# Patient Record
Sex: FEMALE | Race: WHITE | NOT HISPANIC OR LATINO | Employment: OTHER | URBAN - METROPOLITAN AREA
[De-identification: names, ages, dates, MRNs, and addresses within clinical notes are randomized per-mention and may not be internally consistent; named-entity substitution may affect disease eponyms.]

---

## 2017-02-14 ENCOUNTER — HOSPITAL ENCOUNTER (OUTPATIENT)
Dept: MAMMOGRAPHY | Facility: MEDICAL CENTER | Age: 52
Discharge: HOME | End: 2017-02-14
Payer: COMMERCIAL

## 2017-02-14 DIAGNOSIS — Z12.31 ENCOUNTER FOR SCREENING MAMMOGRAM FOR MALIGNANT NEOPLASM OF BREAST: ICD-10-CM

## 2017-02-14 PROCEDURE — G0202 SCR MAMMO BI INCL CAD: HCPCS

## 2017-02-14 PROCEDURE — 77063 BREAST TOMOSYNTHESIS BI: CPT

## 2017-02-20 ENCOUNTER — ALLSCRIPTS OFFICE VISIT (OUTPATIENT)
Dept: OTHER | Facility: OTHER | Age: 52
End: 2017-02-20

## 2017-05-18 ENCOUNTER — ALLSCRIPTS OFFICE VISIT (OUTPATIENT)
Dept: OTHER | Facility: OTHER | Age: 52
End: 2017-05-18

## 2017-05-18 DIAGNOSIS — M54.2 CERVICALGIA: ICD-10-CM

## 2017-05-18 DIAGNOSIS — S16.1XXA STRAIN OF MUSCLE, FASCIA AND TENDON AT NECK LEVEL, INITIAL ENCOUNTER: ICD-10-CM

## 2017-06-22 ENCOUNTER — TRANSCRIBE ORDERS (OUTPATIENT)
Dept: ADMINISTRATIVE | Facility: HOSPITAL | Age: 52
End: 2017-06-22

## 2017-06-22 ENCOUNTER — HOSPITAL ENCOUNTER (OUTPATIENT)
Dept: RADIOLOGY | Facility: HOSPITAL | Age: 52
Discharge: HOME | End: 2017-06-22
Payer: COMMERCIAL

## 2017-06-22 DIAGNOSIS — M54.2 CERVICALGIA: ICD-10-CM

## 2017-06-22 DIAGNOSIS — R52 PAIN: ICD-10-CM

## 2017-06-22 DIAGNOSIS — S16.1XXA STRAIN OF MUSCLE, FASCIA AND TENDON AT NECK LEVEL, INITIAL ENCOUNTER: ICD-10-CM

## 2017-06-22 DIAGNOSIS — R52 PAIN: Primary | ICD-10-CM

## 2017-06-22 PROCEDURE — 72050 X-RAY EXAM NECK SPINE 4/5VWS: CPT

## 2017-06-22 PROCEDURE — 72110 X-RAY EXAM L-2 SPINE 4/>VWS: CPT

## 2017-06-22 PROCEDURE — 72170 X-RAY EXAM OF PELVIS: CPT
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
## 2018-01-12 NOTE — MISCELLANEOUS
Message   Recorded as Task   Date: 12/05/2016 11:17 AM, Created By: Luis Fernando Restrepo   Task Name: Call Back   Assigned To: Gabbi Llamas   Regarding Patient: Heath Thornton, Status: Active   Comment:    Gabbi Llamas - 05 Dec 2016 11:17 AM     TASK CREATED  Pt called to inquire if hardware used in her surgery 2009 was MRI compatible    Confirmed with KDM and informed her it is          Signatures   Electronically signed by : Radha Lopez, ; Dec  5 2016 11:18AM EST                       (Author)

## 2018-01-13 VITALS
SYSTOLIC BLOOD PRESSURE: 120 MMHG | HEIGHT: 64 IN | WEIGHT: 151 LBS | RESPIRATION RATE: 16 BRPM | TEMPERATURE: 100.1 F | DIASTOLIC BLOOD PRESSURE: 78 MMHG | HEART RATE: 82 BPM | BODY MASS INDEX: 25.78 KG/M2

## 2018-01-13 VITALS
TEMPERATURE: 100.1 F | HEART RATE: 104 BPM | OXYGEN SATURATION: 97 % | SYSTOLIC BLOOD PRESSURE: 118 MMHG | WEIGHT: 147 LBS | BODY MASS INDEX: 25.1 KG/M2 | HEIGHT: 64 IN | DIASTOLIC BLOOD PRESSURE: 72 MMHG | RESPIRATION RATE: 15 BRPM

## 2018-02-14 DIAGNOSIS — Z12.31 ENCOUNTER FOR SCREENING MAMMOGRAM FOR MALIGNANT NEOPLASM OF BREAST: ICD-10-CM

## 2018-02-15 ENCOUNTER — HOSPITAL ENCOUNTER (OUTPATIENT)
Dept: RADIOLOGY | Facility: MEDICAL CENTER | Age: 53
Discharge: HOME | End: 2018-02-15
Payer: COMMERCIAL

## 2018-02-15 DIAGNOSIS — Z12.31 ENCOUNTER FOR SCREENING MAMMOGRAM FOR MALIGNANT NEOPLASM OF BREAST: ICD-10-CM

## 2018-02-15 PROCEDURE — 77063 BREAST TOMOSYNTHESIS BI: CPT

## 2018-02-15 PROCEDURE — 77067 SCR MAMMO BI INCL CAD: CPT

## 2018-02-22 ENCOUNTER — OFFICE VISIT (OUTPATIENT)
Dept: SURGICAL ONCOLOGY | Facility: CLINIC | Age: 53
End: 2018-02-22
Payer: COMMERCIAL

## 2018-02-22 VITALS
DIASTOLIC BLOOD PRESSURE: 68 MMHG | HEIGHT: 64 IN | WEIGHT: 153 LBS | SYSTOLIC BLOOD PRESSURE: 116 MMHG | HEART RATE: 88 BPM | BODY MASS INDEX: 26.12 KG/M2 | RESPIRATION RATE: 16 BRPM

## 2018-02-22 DIAGNOSIS — Z12.31 ENCOUNTER FOR SCREENING MAMMOGRAM FOR BREAST CANCER: ICD-10-CM

## 2018-02-22 DIAGNOSIS — Z80.3 FAMILY HISTORY OF BREAST CANCER IN MOTHER: ICD-10-CM

## 2018-02-22 DIAGNOSIS — R92.2 DENSE BREAST TISSUE: ICD-10-CM

## 2018-02-22 DIAGNOSIS — N60.19 FIBROCYSTIC BREAST CHANGES, UNSPECIFIED LATERALITY: Primary | ICD-10-CM

## 2018-02-22 PROCEDURE — 99213 OFFICE O/P EST LOW 20 MIN: CPT | Performed by: SURGERY

## 2018-02-22 RX ORDER — MELOXICAM 15 MG/1
TABLET ORAL
COMMUNITY
Start: 2018-02-14 | End: 2020-06-18 | Stop reason: HOSPADM

## 2018-02-22 NOTE — LETTER
February 22, 2018     [address]  [address] [place] [address]    Patient: Arjun Real   YOB: 1965   Date of Visit: 2/22/2018       Dear Dr Tania Sousa:    Thank you for referring Rupinder Shahid to me for evaluation  Below are my notes for this consultation  If you have questions, please do not hesitate to call me  I look forward to following your patient along with you  Sincerely,        Tammy Gómez MD        CC: No Recipients  Tammy Gómez MD  2/22/2018 10:14 AM  Sign at close encounter     Surgical Oncology Follow Up       [address] [place] [address]  1965  [id number]  [address] [facility],[address]  [facility] [place] [address]    Chief Complaint   Patient presents with    Follow-up     yearly follow up pt had mammo done 2/15       Assessment/Plan     Advance Care Planning/Advance Directives:  Did not discuss  with the patient  No history exists  History of Present Illness: annual follow up  -Interval History:n/a    Review of Systems:  Review of Systems   Constitutional: Negative  Negative for appetite change and fever  Eyes: Negative  Respiratory: Negative for shortness of breath  Cardiovascular: Negative  Gastrointestinal: Negative  Endocrine: Negative  Genitourinary: Negative  Musculoskeletal: Negative  Negative for arthralgias and myalgias  Skin: Negative  Allergic/Immunologic: Negative  Neurological: Negative  Hematological: Negative  Negative for adenopathy  Does not bruise/bleed easily  Psychiatric/Behavioral: Negative          Patient Active Problem List   Diagnosis    Fibrocystic breast changes    Dense breast tissue    Family history of breast cancer in mother     Past Medical History:   Diagnosis Date    Anxiety     Asthenia     Cervical vertebral fusion     Disease of thyroid gland     Fibrocystic breast changes     GERD (gastroesophageal reflux disease)     Heartburn     High risk HPV infection     Palpitations     Urinary frequency      Past Surgical History:   Procedure Laterality Date    BACK SURGERY  2008    cervical vertebral fusion    CERVIX SURGERY      cryosurgery    DILATION AND CURETTAGE OF UTERUS      HYSTERECTOMY  2007    SHOULDER SURGERY Left      Family History   Problem Relation Age of Onset    Breast cancer Mother      age at dx unk     Social History     Social History    Marital status: /Civil Union     Spouse name: N/A    Number of children: N/A    Years of education: N/A     Occupational History    Not on file  Social History Main Topics    Smoking status: Never Smoker    Smokeless tobacco: Never Used    Alcohol use No    Drug use: No    Sexual activity: Not on file     Other Topics Concern    Not on file     Social History Narrative    No narrative on file       Current Outpatient Prescriptions:     meloxicam (MOBIC) 15 mg tablet, , Disp: , Rfl:     omeprazole (PRILOSEC) 20 mg delayed release capsule, Take 20 mg by mouth , Disp: , Rfl:   Allergies   Allergen Reactions    Bactrim [Sulfamethoxazole-Trimethoprim] Itching    Duloxetine Hcl Vomiting    Hydrocodone Headache    Morphine Headache    Oxycodone Vomiting    Percocet  [Oxycodone-Acetaminophen]     Sulfa Antibiotics     Trimethoprim        The following portions of the patient's history were reviewed and updated as appropriate: allergies, current medications, past family history, past medical history, past social history, past surgical history and problem list         Vitals:    02/22/18 1000   BP: 116/68   Pulse: 88   Resp: 16       Physical Exam   Constitutional: She is oriented to person, place, and time  She appears well-developed and well-nourished  HENT:   Head: Normocephalic and atraumatic     Pulmonary/Chest: Right breast exhibits no inverted nipple, no mass, no nipple discharge, no skin change and no tenderness  Left breast exhibits no inverted nipple, no mass, no nipple discharge, no skin change and no tenderness  Lymphadenopathy:        Right axillary: No pectoral and no lateral adenopathy present  Left axillary: No pectoral and no lateral adenopathy present  Right: No supraclavicular adenopathy present  Left: No supraclavicular adenopathy present  Neurological: She is alert and oriented to person, place, and time  Psychiatric: She has a normal mood and affect  Results:    Imaging  02/15/2018 bilateral 3D screening mammogram is benign BI-RADS one the density category of three    I reviewed the above imaging data  Discussion/Summary:  49-year-old female with fibrocystic changes, dense breast tissue and family history  There are no concerns on examination today  Her recent mammogram is benign  I will therefore see her again in one year following her mammogram or sooner should the need arise

## 2018-02-22 NOTE — PROGRESS NOTES
Surgical Oncology Follow Up       [address]  [facility] [address]  1965  [id number]  [address]  [facility] [address] [id number]    Chief Complaint   Patient presents with    Follow-up     yearly follow up pt had mammo done 2/15       Assessment/Plan     Advance Care Planning/Advance Directives:  Did not discuss  with the patient  No history exists  History of Present Illness: annual follow up  -Interval History:n/a    Review of Systems:  Review of Systems   Constitutional: Negative  Negative for appetite change and fever  Eyes: Negative  Respiratory: Negative for shortness of breath  Cardiovascular: Negative  Gastrointestinal: Negative  Endocrine: Negative  Genitourinary: Negative  Musculoskeletal: Negative  Negative for arthralgias and myalgias  Skin: Negative  Allergic/Immunologic: Negative  Neurological: Negative  Hematological: Negative  Negative for adenopathy  Does not bruise/bleed easily  Psychiatric/Behavioral: Negative          Patient Active Problem List   Diagnosis    Fibrocystic breast changes    Dense breast tissue    Family history of breast cancer in mother     Past Medical History:   Diagnosis Date    Anxiety     Asthenia     Cervical vertebral fusion     Disease of thyroid gland     Fibrocystic breast changes     GERD (gastroesophageal reflux disease)     Heartburn     High risk HPV infection     Palpitations     Urinary frequency      Past Surgical History:   Procedure Laterality Date    BACK SURGERY  2008    cervical vertebral fusion    CERVIX SURGERY      cryosurgery    DILATION AND CURETTAGE OF UTERUS      HYSTERECTOMY  2007    SHOULDER SURGERY Left      Family History   Problem Relation Age of Onset    Breast cancer Mother      age at dx unk     Social History     Social History    Marital status: /Civil Union     Spouse name: N/A    Number of children: N/A    Years of education: N/A     Occupational History    Not on file  Social History Main Topics    Smoking status: Never Smoker    Smokeless tobacco: Never Used    Alcohol use No    Drug use: No    Sexual activity: Not on file     Other Topics Concern    Not on file     Social History Narrative    No narrative on file       Current Outpatient Prescriptions:     meloxicam (MOBIC) 15 mg tablet, , Disp: , Rfl:     omeprazole (PRILOSEC) 20 mg delayed release capsule, Take 20 mg by mouth , Disp: , Rfl:   Allergies   Allergen Reactions    Bactrim [Sulfamethoxazole-Trimethoprim] Itching    Duloxetine Hcl Vomiting    Hydrocodone Headache    Morphine Headache    Oxycodone Vomiting    Percocet  [Oxycodone-Acetaminophen]     Sulfa Antibiotics     Trimethoprim        The following portions of the patient's history were reviewed and updated as appropriate: allergies, current medications, past family history, past medical history, past social history, past surgical history and problem list         Vitals:    02/22/18 1000   BP: 116/68   Pulse: 88   Resp: 16       Physical Exam   Constitutional: She is oriented to person, place, and time  She appears well-developed and well-nourished  HENT:   Head: Normocephalic and atraumatic  Pulmonary/Chest: Right breast exhibits no inverted nipple, no mass, no nipple discharge, no skin change and no tenderness  Left breast exhibits no inverted nipple, no mass, no nipple discharge, no skin change and no tenderness  Lymphadenopathy:        Right axillary: No pectoral and no lateral adenopathy present  Left axillary: No pectoral and no lateral adenopathy present  Right: No supraclavicular adenopathy present  Left: No supraclavicular adenopathy present  Neurological: She is alert and oriented to person, place, and time     Psychiatric: She has a normal mood and affect  Results:    Imaging  02/15/2018 bilateral 3D screening mammogram is benign BI-RADS one the density category of three    I reviewed the above imaging data  Discussion/Summary:  57-year-old female with fibrocystic changes, dense breast tissue and family history  There are no concerns on examination today  Her recent mammogram is benign  I will therefore see her again in one year following her mammogram or sooner should the need arise

## 2018-07-05 ENCOUNTER — TRANSCRIBE ORDERS (OUTPATIENT)
Dept: ADMINISTRATIVE | Facility: HOSPITAL | Age: 53
End: 2018-07-05

## 2018-07-05 ENCOUNTER — APPOINTMENT (OUTPATIENT)
Dept: RADIOLOGY | Facility: MEDICAL CENTER | Age: 53
End: 2018-07-05
Payer: COMMERCIAL

## 2018-07-05 DIAGNOSIS — R07.81 PLEURITIC CHEST PAIN: Primary | ICD-10-CM

## 2018-07-05 PROCEDURE — 71046 X-RAY EXAM CHEST 2 VIEWS: CPT

## 2018-09-13 ENCOUNTER — TELEPHONE (OUTPATIENT)
Dept: SURGICAL ONCOLOGY | Facility: CLINIC | Age: 53
End: 2018-09-13

## 2018-09-13 NOTE — TELEPHONE ENCOUNTER
Pt called with concerns of a possible recurring cyst in her left breast  She is feeling discomfort and a lump in her left breast   She shares she feels this is similar to her previous cyst she had aspirated  Offerred appt earlier but pt wanted to be seen at the [facility]  Appt scheduled for 09/27/18

## 2018-09-27 ENCOUNTER — OFFICE VISIT (OUTPATIENT)
Dept: SURGICAL ONCOLOGY | Facility: CLINIC | Age: 53
End: 2018-09-27
Payer: COMMERCIAL

## 2018-09-27 VITALS
BODY MASS INDEX: 25.78 KG/M2 | HEIGHT: 64 IN | SYSTOLIC BLOOD PRESSURE: 120 MMHG | WEIGHT: 151 LBS | RESPIRATION RATE: 16 BRPM | HEART RATE: 79 BPM | TEMPERATURE: 98.9 F | DIASTOLIC BLOOD PRESSURE: 86 MMHG

## 2018-09-27 DIAGNOSIS — N60.12 FIBROCYSTIC CHANGES OF LEFT BREAST: Primary | ICD-10-CM

## 2018-09-27 PROCEDURE — 76642 ULTRASOUND BREAST LIMITED: CPT | Performed by: SURGERY

## 2018-09-27 PROCEDURE — 99213 OFFICE O/P EST LOW 20 MIN: CPT | Performed by: SURGERY

## 2018-09-27 RX ORDER — BUDESONIDE 90 UG/1
AEROSOL, POWDER RESPIRATORY (INHALATION)
Refills: 0 | COMMUNITY
Start: 2018-09-21

## 2018-09-27 RX ORDER — MONTELUKAST SODIUM 10 MG/1
TABLET ORAL
COMMUNITY
Start: 2018-08-30

## 2018-09-27 RX ORDER — ALBUTEROL SULFATE 90 UG/1
2 AEROSOL, METERED RESPIRATORY (INHALATION)
COMMUNITY
Start: 2018-09-18

## 2018-09-27 NOTE — PROGRESS NOTES
Surgical Oncology Follow Up       [address] [place] [address],[address] [place] [address]  [facility] [address]  1965  [id number]  [address] [place] [address],[address] [place] [address]  [facility] SURG[facility] ONCOLOG[facility] [place] [id number]    Chief Complaint   Patient presents with    Breast Mass     Pt is here for left breast lump        Assessment/Plan   Diagnoses and all orders for this visit:    Fibrocystic changes of left breast            Advance Care Planning/Advance Directives:  Did not discuss  with the patient  Oncology History:     No history exists  History of Present Illness: follow up   -Interval History: left sided breast pain x 3 months    Review of Systems:  Review of Systems   Constitutional: Negative  Negative for appetite change and fever  Eyes: Negative  Respiratory: Negative for shortness of breath  Cardiovascular: Negative  Gastrointestinal: Negative  Endocrine: Negative  Genitourinary: Negative  Musculoskeletal: Negative  Negative for arthralgias and myalgias  Skin: Negative  Allergic/Immunologic: Negative  Neurological: Negative  Hematological: Negative  Negative for adenopathy  Does not bruise/bleed easily  Psychiatric/Behavioral: Negative          Patient Active Problem List   Diagnosis    Fibrocystic breast changes    Dense breast tissue    Family history of breast cancer in mother    Encounter for screening mammogram for breast cancer     Past Medical History:   Diagnosis Date    Anxiety     Asthenia     Asthma     Cervical vertebral fusion     Disease of thyroid gland     Fibrocystic breast changes     GERD (gastroesophageal reflux disease)     Heartburn     High risk HPV infection     Palpitations     Palpitations     Thyroid disease     Thyroid nodule     Urinary frequency      Past Surgical History:   Procedure Laterality Date    BACK SURGERY  2008 cervical vertebral fusion    CERVICAL FUSION  2008    CERVIX SURGERY      cryosurgery    DILATION AND CURETTAGE OF UTERUS      SAB    HYSTERECTOMY  2007    SHOULDER SURGERY Left      Family History   Problem Relation Age of Onset   Tamar Martin Breast cancer Mother         age at dx unk     Social History     Social History    Marital status: /Civil Union     Spouse name: N/A    Number of children: 1    Years of education: N/A     Occupational History          Social History Main Topics    Smoking status: Never Smoker    Smokeless tobacco: Never Used    Alcohol use No    Drug use: No    Sexual activity: Not on file     Other Topics Concern    Not on file     Social History Narrative    Denied: H/O domestic violence    Lives with            Current Outpatient Prescriptions:     albuterol (PROVENTIL HFA,VENTOLIN HFA) 90 mcg/act inhaler, Inhale 2 puffs, Disp: , Rfl:     meloxicam (MOBIC) 15 mg tablet, , Disp: , Rfl:     montelukast (SINGULAIR) 10 mg tablet, TAKE 1 TABLET (10 MG TOTAL) BY MOUTH NIGHTLY , Disp: , Rfl:     omeprazole (PRILOSEC) 20 mg delayed release capsule, Take 20 mg by mouth , Disp: , Rfl:     PULMICORT FLEXHALER 90 MCG/ACT inhaler, INHALE TWO PUFFS TWICE A DAY , Disp: , Rfl: 0  Allergies   Allergen Reactions    Bactrim [Sulfamethoxazole-Trimethoprim] Itching    Duloxetine Hcl Vomiting    Hydrocodone Headache    Morphine Headache    Oxycodone Vomiting    Percocet  [Oxycodone-Acetaminophen]     Sulfa Antibiotics     Trimethoprim        The following portions of the patient's history were reviewed and updated as appropriate: allergies, current medications, past family history, past medical history, past social history, past surgical history and problem list         Vitals:    09/27/18 1118   BP: 120/86   Pulse: 79   Resp: 16   Temp: 98 9 °F (37 2 °C)       Physical Exam   Constitutional: She is oriented to person, place, and time   She appears well-developed and well-nourished  HENT:   Head: Normocephalic and atraumatic  Pulmonary/Chest: Left breast exhibits tenderness  Left breast exhibits no inverted nipple, no mass, no nipple discharge and no skin change  Lymphadenopathy:        Left axillary: No pectoral and no lateral adenopathy present  Left: No supraclavicular adenopathy present  Neurological: She is alert and oriented to person, place, and time  Psychiatric: She has a normal mood and affect  Results:      Imaging       Breast Ultrasound     Date/Time 9/27/2018 11:45 AM     Performed by  Amira Hay by Irma De Anda      Procedure Details   Procedure Notes: Ultrasound was performed with attention to the upper outer left breast in the area of fullness and tenderness  There is dense glandular tissue throughout  There are two areas of complex appearing cysts in the one o'clock periareolar deep axis adjacent to the rib and the outer portion of the left breast   Both of these are subcentimeter in size  There are no solid masses or areas of distortion  There are no cysts amenable for aspiration  Discussion/Summary:  57-year-old female with a history of dense breast tissue, fibrocystic changes and family history of breast cancer  In the area of concern today in the upper outer left breast she has dense glandular tissue and subcentimeter fibrocystic changes  There is nothing amenable to the cyst aspiration  She is scheduled for mammogram in February  I am seeing her again following this  I advised her to return sooner should the need arise

## 2018-10-06 ENCOUNTER — ANNUAL EXAM (OUTPATIENT)
Dept: OBGYN CLINIC | Facility: CLINIC | Age: 53
End: 2018-10-06
Payer: COMMERCIAL

## 2018-10-06 VITALS
BODY MASS INDEX: 24.75 KG/M2 | DIASTOLIC BLOOD PRESSURE: 80 MMHG | SYSTOLIC BLOOD PRESSURE: 126 MMHG | HEIGHT: 64 IN | WEIGHT: 145 LBS

## 2018-10-06 DIAGNOSIS — Z12.4 PAP SMEAR FOR CERVICAL CANCER SCREENING: ICD-10-CM

## 2018-10-06 DIAGNOSIS — Z01.419 ENCOUNTER FOR GYNECOLOGICAL EXAMINATION WITHOUT ABNORMAL FINDING: Primary | ICD-10-CM

## 2018-10-06 DIAGNOSIS — N30.01 ACUTE CYSTITIS WITH HEMATURIA: ICD-10-CM

## 2018-10-06 DIAGNOSIS — R10.2 PELVIC PAIN: ICD-10-CM

## 2018-10-06 LAB
SL AMB  POCT GLUCOSE, UA: NEGATIVE
SL AMB LEUKOCYTE ESTERASE,UA: ABNORMAL
SL AMB POCT BLOOD,UA: ABNORMAL
SL AMB POCT KETONES,UA: NEGATIVE
SL AMB POCT NITRITE,UA: NEGATIVE
SL AMB POCT PH,UA: 7.5
SL AMB POCT SPECIFIC GRAVITY,UA: 1.01
SL AMB POCT URINE PROTEIN: NEGATIVE

## 2018-10-06 PROCEDURE — S0612 ANNUAL GYNECOLOGICAL EXAMINA: HCPCS | Performed by: OBSTETRICS & GYNECOLOGY

## 2018-10-06 PROCEDURE — 81002 URINALYSIS NONAUTO W/O SCOPE: CPT | Performed by: OBSTETRICS & GYNECOLOGY

## 2018-10-06 RX ORDER — NITROFURANTOIN 25; 75 MG/1; MG/1
100 CAPSULE ORAL 2 TIMES DAILY
Qty: 14 CAPSULE | Refills: 0 | Status: SHIPPED | OUTPATIENT
Start: 2018-10-06 | End: 2020-06-18 | Stop reason: ALTCHOICE

## 2018-10-06 RX ORDER — DIAZEPAM 5 MG/1
5 TABLET ORAL EVERY 6 HOURS PRN
COMMUNITY

## 2018-10-06 NOTE — PROGRESS NOTES
Assessment/Plan:    Normal annual gynecological exam  Pap smear done of the vaginal cuff  New onset mid right abdominal and mid for right pelvic pain which is intermittent but daily over the last 3 months, we could not reproduce the pain on today's exam   We will send her for abdominal and pelvic CT scan to evaluate the area  She had a recent urinary tract infection which she said she did receive a test of cure, will recheck a urine at today's visit to be sure that there are no associated urinary tract infections or problems  Problem List Items Addressed This Visit     Encounter for gynecological examination without abnormal finding - Primary    Relevant Orders    GP Liquid-Based Pap + HPV Plus    Pap smear for cervical cancer screening    Relevant Orders    GP Liquid-Based Pap + HPV Plus    Pelvic pain    Relevant Orders    CT abdomen pelvis w contrast            Subjective:      Patient ID: Arjun Real is a 52 y o  female  Here for annual exam with complaints of RLQ / midquadrant pain over the last 3 months - intermittent but at least daily - no vaginal bleeding or discharge - bowels are normal and bladder is normal but has frequent urination and some leakage - had recent UTI with e coli and was treated - up to date with colonoscopy and mammogram  - recent blood work and f/u with rheumatology due to Sjogrens diagnosis - she had had a pelvic ultrasound 2 years ago and the left ovary was visualized as 2 cm and normal right ovary could not be seen  We spoke about trying to do it imaging of this area that is uncomfortable of we both side of the perhaps a CT scan would give us more information  The area of pain that she denotes begins kind of in the mid abdominal area and courses down towards the bladder        The following portions of the patient's history were reviewed and updated as appropriate:   She  has a past medical history of Anxiety; Arthritis; Asthenia;  Asthma; Breast disorder; Cervical vertebral fusion; Disease of thyroid gland; Fibrocystic breast changes; GERD (gastroesophageal reflux disease); Heartburn; High risk HPV infection; IBS (irritable bowel syndrome); Palpitations; Palpitations; Thyroid disease; Thyroid nodule; and Urinary frequency  She   Patient Active Problem List    Diagnosis Date Noted    Encounter for gynecological examination without abnormal finding 10/06/2018    Pap smear for cervical cancer screening 10/06/2018    Pelvic pain 10/06/2018    Family history of breast cancer in mother 02/22/2018    Encounter for screening mammogram for breast cancer 02/22/2018    Fibrocystic breast changes     Dense breast tissue      She  has a past surgical history that includes Back surgery (2008); Cervix surgery; Dilation and curettage of uterus; Hysterectomy (2007); Shoulder surgery (Left); and Cervical fusion (2008)  Her family history includes Breast cancer in her mother; Heart disease in her father  She  reports that she has never smoked  She has never used smokeless tobacco  She reports that she does not drink alcohol or use drugs  Current Outpatient Prescriptions   Medication Sig Dispense Refill    albuterol (PROVENTIL HFA,VENTOLIN HFA) 90 mcg/act inhaler Inhale 2 puffs      diazepam (VALIUM) 5 mg tablet Take 5 mg by mouth every 6 (six) hours as needed for anxiety      meloxicam (MOBIC) 15 mg tablet       PULMICORT FLEXHALER 90 MCG/ACT inhaler INHALE TWO PUFFS TWICE A DAY  0    montelukast (SINGULAIR) 10 mg tablet TAKE 1 TABLET (10 MG TOTAL) BY MOUTH NIGHTLY   omeprazole (PRILOSEC) 20 mg delayed release capsule Take 20 mg by mouth  No current facility-administered medications for this visit  Current Outpatient Prescriptions on File Prior to Visit   Medication Sig    albuterol (PROVENTIL HFA,VENTOLIN HFA) 90 mcg/act inhaler Inhale 2 puffs    meloxicam (MOBIC) 15 mg tablet     PULMICORT FLEXHALER 90 MCG/ACT inhaler INHALE TWO PUFFS TWICE A DAY      montelukast (SINGULAIR) 10 mg tablet TAKE 1 TABLET (10 MG TOTAL) BY MOUTH NIGHTLY   omeprazole (PRILOSEC) 20 mg delayed release capsule Take 20 mg by mouth  No current facility-administered medications on file prior to visit  She is allergic to bactrim [sulfamethoxazole-trimethoprim]; duloxetine hcl; hydrocodone; morphine; oxycodone; percocet  [oxycodone-acetaminophen]; sulfa antibiotics; and trimethoprim       Review of Systems   Constitutional: Positive for fatigue  Negative for chills, fever and unexpected weight change  HENT: Negative for dental problem, sinus pain and sinus pressure  Eyes: Negative for visual disturbance  Respiratory: Negative for cough, shortness of breath and wheezing  Cardiovascular: Negative for chest pain and leg swelling  Gastrointestinal: Negative for constipation, diarrhea, nausea and vomiting  Endocrine: Positive for heat intolerance and polydipsia  Genitourinary: Positive for frequency and pelvic pain  Negative for menstrual problem and urgency  Musculoskeletal: Positive for arthralgias and back pain  Skin:        Breast pain and lumps   Allergic/Immunologic: Positive for environmental allergies  Neurological: Positive for dizziness  Negative for headaches  Hematological: Bruises/bleeds easily  Psychiatric/Behavioral: The patient is nervous/anxious  Objective:             Physical Exam   Constitutional: She is oriented to person, place, and time  She appears well-developed and well-nourished  No distress  Older white female    HENT:   Head: Normocephalic and atraumatic  Neck: Normal range of motion  Neck supple  No thyromegaly present  Cardiovascular: Normal rate and regular rhythm  Pulmonary/Chest: Effort normal and breath sounds normal  No respiratory distress  She has no wheezes  She has no rales  She exhibits no tenderness   Right breast exhibits no inverted nipple, no mass, no nipple discharge, no skin change and no tenderness  Left breast exhibits no inverted nipple, no mass, no nipple discharge, no skin change and no tenderness  Breasts are symmetrical    Abdominal: Soft  She exhibits no distension and no mass  There is no tenderness  There is no rebound and no guarding  Genitourinary:   Genitourinary Comments: Normal female no vulvar or vaginal lesions, vaginal cuff is intact and well healed and Pap smear is taken, the vaginal walls are atrophic, cervix and uterus are surgically absent and there are no palpable masses there are no adnexal masses either and the exam is nontender  Deep palpation of the internal areas elicited no discomfort and we could not duplicate the pain that she is having on the right side  Rectal exam reveals no palpable masses and has normal sphincter tone with guaiac-negative stool again deep palpation did not find any discomfort or reproduce any of the right abdominal pain she is experiencing   Neurological: She is alert and oriented to person, place, and time  Psychiatric: She has a normal mood and affect  Her behavior is normal    Vitals reviewed  Addendum a urinalysis done in the office did show moderate amount of blood and leukocytes and decisions made to place Michelle Mallory back on an antibiotic  We do have some concerns that some of this pain could indeed be kidney stone related and so she is encouraged to strain her urine and go ahead and call for the CT scan we ordered  For some reason the pain becomes severe she should present to the emergency room for further evaluation    We will let her know what the culture shows and whether not we need to follow-up otherwise the

## 2018-10-08 ENCOUNTER — TELEPHONE (OUTPATIENT)
Dept: UROLOGY | Facility: AMBULATORY SURGERY CENTER | Age: 53
End: 2018-10-08

## 2018-10-08 LAB — BACTERIA UR CULT: NORMAL

## 2018-10-08 NOTE — TELEPHONE ENCOUNTER
Reason for appointment/Complaint/Diagnosis : kidney stone symptoms/hx of hematuria    Insurance: BC    History of Cancer? no                       If yes, what kind? Previous urologist?     Yes more than 10years                  Records requested/where? NO    Outside testing/where? NO    Location Preference for office visit?  [facility]

## 2018-10-10 LAB
HPV HR 12 DNA CVX QL NAA+PROBE: NOT DETECTED
HPV16 DNA SPEC QL NAA+PROBE: NOT DETECTED
HPV18 DNA SPEC QL NAA+PROBE: NOT DETECTED
THIN PREP CVX: NORMAL

## 2018-10-12 ENCOUNTER — HOSPITAL ENCOUNTER (OUTPATIENT)
Dept: RADIOLOGY | Facility: MEDICAL CENTER | Age: 53
Discharge: HOME | End: 2018-10-12
Payer: COMMERCIAL

## 2018-10-12 DIAGNOSIS — R10.2 PELVIC PAIN: ICD-10-CM

## 2018-10-12 PROCEDURE — 74177 CT ABD & PELVIS W/CONTRAST: CPT

## 2018-10-12 RX ADMIN — IOHEXOL 100 ML: 350 INJECTION, SOLUTION INTRAVENOUS at 13:06

## 2018-10-16 NOTE — PROGRESS NOTES
Please call patient her and review result - had right midlevel pain - should review liver findings with PCP or a specialist

## 2019-02-18 ENCOUNTER — HOSPITAL ENCOUNTER (OUTPATIENT)
Dept: RADIOLOGY | Facility: MEDICAL CENTER | Age: 54
Discharge: HOME | End: 2019-02-18
Payer: COMMERCIAL

## 2019-02-18 VITALS — HEIGHT: 64 IN | WEIGHT: 145 LBS | BODY MASS INDEX: 24.75 KG/M2

## 2019-02-18 DIAGNOSIS — Z12.31 ENCOUNTER FOR SCREENING MAMMOGRAM FOR BREAST CANCER: ICD-10-CM

## 2019-02-18 PROCEDURE — 77063 BREAST TOMOSYNTHESIS BI: CPT

## 2019-02-18 PROCEDURE — 77067 SCR MAMMO BI INCL CAD: CPT

## 2019-02-28 ENCOUNTER — OFFICE VISIT (OUTPATIENT)
Dept: SURGICAL ONCOLOGY | Facility: CLINIC | Age: 54
End: 2019-02-28
Payer: COMMERCIAL

## 2019-02-28 VITALS
HEIGHT: 64 IN | SYSTOLIC BLOOD PRESSURE: 120 MMHG | TEMPERATURE: 98.5 F | BODY MASS INDEX: 25.78 KG/M2 | RESPIRATION RATE: 14 BRPM | WEIGHT: 151 LBS | HEART RATE: 75 BPM | DIASTOLIC BLOOD PRESSURE: 80 MMHG

## 2019-02-28 DIAGNOSIS — N64.4 CHRONIC PAIN OF BREAST: ICD-10-CM

## 2019-02-28 DIAGNOSIS — Z80.3 FAMILY HISTORY OF BREAST CANCER IN MOTHER: ICD-10-CM

## 2019-02-28 DIAGNOSIS — R92.2 DENSE BREAST TISSUE: ICD-10-CM

## 2019-02-28 DIAGNOSIS — G89.29 CHRONIC PAIN OF BREAST: ICD-10-CM

## 2019-02-28 DIAGNOSIS — N60.19 FIBROCYSTIC BREAST CHANGES, UNSPECIFIED LATERALITY: Primary | ICD-10-CM

## 2019-02-28 PROCEDURE — 99213 OFFICE O/P EST LOW 20 MIN: CPT | Performed by: SURGERY

## 2019-02-28 RX ORDER — ALPRAZOLAM 0.5 MG/1
TABLET ORAL
COMMUNITY
Start: 2018-12-07

## 2019-02-28 NOTE — PROGRESS NOTES
Surgical Oncology Follow Up       [address] [place] [address],[address] [place] [address]  [facility] [address]  1965  [id number]  [address] [facility],[address] [place] [address]  [facility] SURGICAL ONCOLOGY [address] [address]    Chief Complaint   Patient presents with    Follow-up     1 year        Assessment/Plan   Diagnoses and all orders for this visit:    Fibrocystic breast changes, unspecified laterality    Chronic pain of breast  -     Evening Primrose Oil 1000 MG CAPS; Take 0 09 capsules (90 mg total) by mouth 3 (three) times a day with meals    Dense breast tissue    Family history of breast cancer in mother    Other orders  -     ALPRAZolam (XANAX) 0 5 mg tablet; TAKE 1 TABLET BY MOUTH AT BEDTIME AS NEEDED        Advance Care Planning/Advance Directives:  Did not discuss  with the patient  Oncology History:     No history exists  History of Present Illness: follow up   -Interval History: none    Review of Systems:  Review of Systems   Constitutional: Negative  Negative for appetite change and fever  Eyes: Negative  Respiratory: Negative for shortness of breath  Cardiovascular: Negative  Gastrointestinal: Negative  Endocrine: Negative  Genitourinary: Negative  Musculoskeletal: Negative  Negative for arthralgias and myalgias  Skin: Negative  Allergic/Immunologic: Negative  Neurological: Negative  Hematological: Negative  Negative for adenopathy  Does not bruise/bleed easily  Psychiatric/Behavioral: Negative          Patient Active Problem List   Diagnosis    Fibrocystic breast changes    Dense breast tissue    Family history of breast cancer in mother   [facility] Encounter for screening mammogram for breast cancer    Encounter for gynecological examination without abnormal finding    Pap smear for cervical cancer screening    Pelvic pain    Acute cystitis with hematuria    Chronic pain of breast     Past Medical History:   Diagnosis Date    Anxiety     Arthritis     Asthenia     Asthma     Cervical vertebral fusion     Disease of thyroid gland     Fibrocystic breast changes     GERD (gastroesophageal reflux disease)     Heartburn     High risk HPV infection     IBS (irritable bowel syndrome)     Palpitations     Palpitations     Thyroid disease     Thyroid nodule     Urinary frequency      Past Surgical History:   Procedure Laterality Date    BACK SURGERY  2008    cervical vertebral fusion    BREAST CYST ASPIRATION Left 2007    CERVICAL FUSION  2008    CERVIX SURGERY      cryosurgery    DILATION AND CURETTAGE OF UTERUS      SAB    HYSTERECTOMY  2007    SHOULDER SURGERY Left      Family History   Problem Relation Age of Onset   Hair Abt Breast cancer Mother         age at dx unk    Heart disease Father      Social History     Socioeconomic History    Marital status: /Civil Union     Spouse name: Not on file    Number of children: 1    Years of education: Not on file    Highest education level: Not on file   Occupational History    Occupation:    Social Needs    Financial resource strain: Not on file    Food insecurity:     Worry: Not on file     Inability: Not on file    Transportation needs:     Medical: Not on file     Non-medical: Not on file   Tobacco Use    Smoking status: Never Smoker    Smokeless tobacco: Never Used   Substance and Sexual Activity    Alcohol use: No    Drug use: No    Sexual activity: Yes     Partners: Male     Comment: pt declines hiv std testing   Lifestyle    Physical activity:     Days per week: Not on file     Minutes per session: Not on file    Stress: Not on file   Relationships    Social connections:     Talks on phone: Not on file     Gets together: Not on file     Attends Christianity service: Not on file     Active member of club or organization: Not on file     Attends meetings of clubs or organizations: Not on file     Relationship status: Not on file    Intimate partner violence:     Fear of current or ex partner: Not on file     Emotionally abused: Not on file     Physically abused: Not on file     Forced sexual activity: Not on file   Other Topics Concern    Not on file   Social History Narrative    Denied: H/O domestic violence    Lives with        Current Outpatient Medications:     albuterol (PROVENTIL HFA,VENTOLIN HFA) 90 mcg/act inhaler, Inhale 2 puffs, Disp: , Rfl:     ALPRAZolam (XANAX) 0 5 mg tablet, TAKE 1 TABLET BY MOUTH AT BEDTIME AS NEEDED, Disp: , Rfl:     diazepam (VALIUM) 5 mg tablet, Take 5 mg by mouth every 6 (six) hours as needed for anxiety, Disp: , Rfl:     meloxicam (MOBIC) 15 mg tablet, , Disp: , Rfl:     montelukast (SINGULAIR) 10 mg tablet, TAKE 1 TABLET (10 MG TOTAL) BY MOUTH NIGHTLY , Disp: , Rfl:     omeprazole (PRILOSEC) 20 mg delayed release capsule, Take 20 mg by mouth , Disp: , Rfl:     PULMICORT FLEXHALER 90 MCG/ACT inhaler, INHALE TWO PUFFS TWICE A DAY , Disp: , Rfl: 0    Evening Primrose Oil 1000 MG CAPS, Take 0 09 capsules (90 mg total) by mouth 3 (three) times a day with meals, Disp: 90 capsule, Rfl: 0    nitrofurantoin (MACROBID) 100 mg capsule, Take 1 capsule (100 mg total) by mouth 2 (two) times a day (Patient not taking: Reported on 2/28/2019), Disp: 14 capsule, Rfl: 0  Allergies   Allergen Reactions    Bactrim [Sulfamethoxazole-Trimethoprim] Itching    Duloxetine Hcl Vomiting    Hydrocodone Headache    Morphine Headache    Oxycodone Vomiting    Percocet  [Oxycodone-Acetaminophen]     Sulfa Antibiotics     Trimethoprim        The following portions of the patient's history were reviewed and updated as appropriate: allergies, current medications, past family history, past medical history, past social history, past surgical history and problem list         Vitals:    02/28/19 1336   BP: 120/80   Pulse: 75   Resp: 14   Temp: 98 5 °F (36 9 °C)       Physical Exam   Constitutional: She is oriented to person, place, and time  She appears well-developed and well-nourished  HENT:   Head: Normocephalic and atraumatic  Pulmonary/Chest: Right breast exhibits no inverted nipple, no mass, no nipple discharge, no skin change and no tenderness  Left breast exhibits tenderness  Left breast exhibits no inverted nipple, no mass, no nipple discharge and no skin change  Lymphadenopathy:        Right axillary: No pectoral and no lateral adenopathy present  Left axillary: No pectoral and no lateral adenopathy present  Right: No supraclavicular adenopathy present  Left: No supraclavicular adenopathy present  Neurological: She is alert and oriented to person, place, and time  Psychiatric: She has a normal mood and affect  Results:      Imaging  02/18/2019 bilateral 3D screening mammogram is benign BI-RADS one density of two    I reviewed the above imaging data  Discussion/Summary:  68-year-old female with fibrocystic changes and history of dense breast tissue  She also has family history of breast cancer in her mother  She continues to have discomfort on the left side  There are no concerns on examination today  Her recent mammogram was also benign  I have made supportive recommendations to her in the past   We previously discussed use of evening Primrose oil  She was hesitant to try this secondary to a sensitivity to medications that she reports  She will try to initiate this  I advised to take this 3 times daily with meals  I will see her again in six months or sooner should the need arise

## 2019-09-26 ENCOUNTER — OFFICE VISIT (OUTPATIENT)
Dept: SURGICAL ONCOLOGY | Facility: CLINIC | Age: 54
End: 2019-09-26
Payer: COMMERCIAL

## 2019-09-26 VITALS
WEIGHT: 150 LBS | HEIGHT: 64 IN | SYSTOLIC BLOOD PRESSURE: 120 MMHG | HEART RATE: 88 BPM | RESPIRATION RATE: 16 BRPM | TEMPERATURE: 99.4 F | DIASTOLIC BLOOD PRESSURE: 80 MMHG | BODY MASS INDEX: 25.61 KG/M2

## 2019-09-26 DIAGNOSIS — Z80.3 FAMILY HISTORY OF BREAST CANCER IN MOTHER: ICD-10-CM

## 2019-09-26 DIAGNOSIS — Z12.31 ENCOUNTER FOR SCREENING MAMMOGRAM FOR BREAST CANCER: ICD-10-CM

## 2019-09-26 DIAGNOSIS — G89.29 CHRONIC PAIN OF BREAST: ICD-10-CM

## 2019-09-26 DIAGNOSIS — N60.19 FIBROCYSTIC BREAST CHANGES, UNSPECIFIED LATERALITY: Primary | ICD-10-CM

## 2019-09-26 DIAGNOSIS — N64.4 CHRONIC PAIN OF BREAST: ICD-10-CM

## 2019-09-26 PROCEDURE — 99213 OFFICE O/P EST LOW 20 MIN: CPT | Performed by: SURGERY

## 2019-09-26 NOTE — PROGRESS NOTES
Surgical Oncology Follow Up       [address] [facility]  [facility] [place]  [address] [facility] [address]  [place]  [address]  1965  [id number]  [address] [facility]  [facility] [place] [address] [place] [address]    Chief Complaint   Patient presents with    Follow-up       Assessment/Plan   Diagnoses and all orders for this visit:    Fibrocystic breast changes, unspecified laterality    Encounter for screening mammogram for breast cancer  -     Mammo screening bilateral w 3d & cad; Future    Chronic pain of breast    Family history of breast cancer in mother        Advance Care Planning/Advance Directives:  Did not discuss  with the patient  Oncology History:     No history exists  History of Present Illness: Follow-up  -Interval History:  None    Review of Systems:  Review of Systems   Constitutional: Negative  Negative for appetite change and fever  Eyes: Negative  Respiratory: Negative for shortness of breath  Cardiovascular: Negative  Gastrointestinal: Negative  Endocrine: Negative  Genitourinary: Negative  Musculoskeletal: Negative  Negative for arthralgias and myalgias  Skin: Negative  Allergic/Immunologic: Negative  Neurological: Negative  Hematological: Negative  Negative for adenopathy  Does not bruise/bleed easily  Psychiatric/Behavioral: Negative          Patient Active Problem List   Diagnosis    Fibrocystic breast changes    Family history of breast cancer in mother   Mannymanuelito Perez Encounter for screening mammogram for breast cancer    Encounter for gynecological examination without abnormal finding    Pap smear for cervical cancer screening    Pelvic pain    Acute cystitis with hematuria    Chronic pain of breast     Past Medical History:   Diagnosis Date    Anxiety     Arthritis     Asthenia     Asthma     Cervical vertebral fusion     Disease of thyroid gland  Fibrocystic breast changes     GERD (gastroesophageal reflux disease)     Heartburn     High risk HPV infection     IBS (irritable bowel syndrome)     Palpitations     Palpitations     Thyroid disease     Thyroid nodule     Urinary frequency      Past Surgical History:   Procedure Laterality Date    BACK SURGERY  2008    cervical vertebral fusion    BREAST CYST ASPIRATION Left 2007    CERVICAL FUSION  2008    CERVIX SURGERY      cryosurgery    DILATION AND CURETTAGE OF UTERUS      SAB    HYSTERECTOMY  2007    SHOULDER SURGERY Left      Family History   Problem Relation Age of Onset   Manny Perez Breast cancer Mother         age at dx unk    Heart disease Father      Social History     Socioeconomic History    Marital status: /Civil Union     Spouse name: Not on file    Number of children: 1    Years of education: Not on file    Highest education level: Not on file   Occupational History    Occupation:    Social Needs    Financial resource strain: Not on file    Food insecurity:     Worry: Not on file     Inability: Not on file    Transportation needs:     Medical: Not on file     Non-medical: Not on file   Tobacco Use    Smoking status: Never Smoker    Smokeless tobacco: Never Used   Substance and Sexual Activity    Alcohol use: No    Drug use: No    Sexual activity: Yes     Partners: Male     Comment: pt declines hiv std testing   Lifestyle    Physical activity:     Days per week: Not on file     Minutes per session: Not on file    Stress: Not on file   Relationships    Social connections:     Talks on phone: Not on file     Gets together: Not on file     Attends Tenriism service: Not on file     Active member of club or organization: Not on file     Attends meetings of clubs or organizations: Not on file     Relationship status: Not on file    Intimate partner violence:     Fear of current or ex partner: Not on file     Emotionally abused: Not on file     Physically abused: Not on file     Forced sexual activity: Not on file   Other Topics Concern    Not on file   Social History Narrative    Denied: H/O domestic violence    Lives with        Current Outpatient Medications:     albuterol (PROVENTIL HFA,VENTOLIN HFA) 90 mcg/act inhaler, Inhale 2 puffs, Disp: , Rfl:     ALPRAZolam (XANAX) 0 5 mg tablet, TAKE 1 TABLET BY MOUTH AT BEDTIME AS NEEDED, Disp: , Rfl:     diazepam (VALIUM) 5 mg tablet, Take 5 mg by mouth every 6 (six) hours as needed for anxiety, Disp: , Rfl:     meloxicam (MOBIC) 15 mg tablet, , Disp: , Rfl:     montelukast (SINGULAIR) 10 mg tablet, TAKE 1 TABLET (10 MG TOTAL) BY MOUTH NIGHTLY , Disp: , Rfl:     omeprazole (PRILOSEC) 20 mg delayed release capsule, Take 20 mg by mouth , Disp: , Rfl:     PULMICORT FLEXHALER 90 MCG/ACT inhaler, INHALE TWO PUFFS TWICE A DAY , Disp: , Rfl: 0    Evening Primrose Oil 1000 MG CAPS, Take 0 09 capsules (90 mg total) by mouth 3 (three) times a day with meals (Patient not taking: Reported on 9/26/2019), Disp: 90 capsule, Rfl: 0    nitrofurantoin (MACROBID) 100 mg capsule, Take 1 capsule (100 mg total) by mouth 2 (two) times a day (Patient not taking: Reported on 2/28/2019), Disp: 14 capsule, Rfl: 0  Allergies   Allergen Reactions    Bactrim [Sulfamethoxazole-Trimethoprim] Itching    Duloxetine Hcl Vomiting    Hydrocodone Headache    Morphine Headache    Oxycodone Vomiting    Percocet  [Oxycodone-Acetaminophen]     Sulfa Antibiotics     Trimethoprim        The following portions of the patient's history were reviewed and updated as appropriate: allergies, current medications, past family history, past medical history, past social history, past surgical history and problem list         Vitals:    09/26/19 1511   BP: 120/80   Pulse: 88   Resp: 16   Temp: 99 4 °F (37 4 °C)       Physical Exam   Constitutional: She is oriented to person, place, and time  She appears well-developed and well-nourished     HENT: Head: Normocephalic and atraumatic  Pulmonary/Chest: Right breast exhibits no inverted nipple, no mass, no nipple discharge, no skin change and no tenderness  Left breast exhibits no inverted nipple, no mass, no nipple discharge, no skin change and no tenderness  Lymphadenopathy:        Right axillary: No pectoral and no lateral adenopathy present  Left axillary: No pectoral and no lateral adenopathy present  Right: No supraclavicular adenopathy present  Left: No supraclavicular adenopathy present  Neurological: She is alert and oriented to person, place, and time  Psychiatric: She has a normal mood and affect  Discussion/Summary:  49-year-old female with fibrocystic changes and family history of breast cancer  She reports continued pain on the left side  She has decided against using the evening Primrose oil  There are no concerns on examination today  I will make arrangements for her mammogram due in February  I will see her again in six months for exam or sooner should the need arise

## 2019-11-06 ENCOUNTER — TELEPHONE (OUTPATIENT)
Dept: OBGYN CLINIC | Facility: CLINIC | Age: 54
End: 2019-11-06

## 2019-11-06 NOTE — TELEPHONE ENCOUNTER
lft  message to Daniele Regan was faxed to [facility]  10-25-19 and was complete

## 2020-02-19 ENCOUNTER — HOSPITAL ENCOUNTER (OUTPATIENT)
Dept: RADIOLOGY | Facility: MEDICAL CENTER | Age: 55
Discharge: HOME | End: 2020-02-19
Payer: COMMERCIAL

## 2020-02-19 VITALS — BODY MASS INDEX: 25.61 KG/M2 | WEIGHT: 150 LBS | HEIGHT: 64 IN

## 2020-02-19 DIAGNOSIS — Z12.31 ENCOUNTER FOR SCREENING MAMMOGRAM FOR BREAST CANCER: ICD-10-CM

## 2020-02-19 PROCEDURE — 77067 SCR MAMMO BI INCL CAD: CPT

## 2020-02-19 PROCEDURE — 77063 BREAST TOMOSYNTHESIS BI: CPT

## 2020-06-18 ENCOUNTER — OFFICE VISIT (OUTPATIENT)
Dept: SURGICAL ONCOLOGY | Facility: CLINIC | Age: 55
End: 2020-06-18
Payer: COMMERCIAL

## 2020-06-18 VITALS
SYSTOLIC BLOOD PRESSURE: 124 MMHG | BODY MASS INDEX: 24.24 KG/M2 | WEIGHT: 142 LBS | TEMPERATURE: 99.4 F | RESPIRATION RATE: 18 BRPM | DIASTOLIC BLOOD PRESSURE: 84 MMHG | HEART RATE: 77 BPM | HEIGHT: 64 IN

## 2020-06-18 DIAGNOSIS — N60.19 FIBROCYSTIC BREAST CHANGES, UNSPECIFIED LATERALITY: Primary | ICD-10-CM

## 2020-06-18 DIAGNOSIS — Z80.9 FAMILY HISTORY OF CANCER IN MOTHER: ICD-10-CM

## 2020-06-18 DIAGNOSIS — Z12.31 VISIT FOR SCREENING MAMMOGRAM: ICD-10-CM

## 2020-06-18 PROCEDURE — 99213 OFFICE O/P EST LOW 20 MIN: CPT | Performed by: NURSE PRACTITIONER

## 2021-02-22 ENCOUNTER — HOSPITAL ENCOUNTER (OUTPATIENT)
Dept: RADIOLOGY | Facility: MEDICAL CENTER | Age: 56
Discharge: HOME | End: 2021-02-22
Payer: MEDICARE

## 2021-02-22 VITALS — BODY MASS INDEX: 24.24 KG/M2 | WEIGHT: 142 LBS | HEIGHT: 64 IN

## 2021-02-22 DIAGNOSIS — Z12.31 VISIT FOR SCREENING MAMMOGRAM: ICD-10-CM

## 2021-02-22 PROCEDURE — 77067 SCR MAMMO BI INCL CAD: CPT

## 2021-02-22 PROCEDURE — 77063 BREAST TOMOSYNTHESIS BI: CPT

## 2021-03-03 ENCOUNTER — TELEPHONE (OUTPATIENT)
Dept: SURGICAL ONCOLOGY | Facility: CLINIC | Age: 56
End: 2021-03-03

## 2021-03-04 ENCOUNTER — OFFICE VISIT (OUTPATIENT)
Dept: SURGICAL ONCOLOGY | Facility: CLINIC | Age: 56
End: 2021-03-04
Payer: MEDICARE

## 2021-03-04 VITALS
SYSTOLIC BLOOD PRESSURE: 118 MMHG | WEIGHT: 154 LBS | BODY MASS INDEX: 26.43 KG/M2 | DIASTOLIC BLOOD PRESSURE: 82 MMHG | HEART RATE: 75 BPM | TEMPERATURE: 97.6 F

## 2021-03-04 DIAGNOSIS — Z12.31 ENCOUNTER FOR SCREENING MAMMOGRAM FOR BREAST CANCER: ICD-10-CM

## 2021-03-04 DIAGNOSIS — G89.29 CHRONIC PAIN OF BREAST: ICD-10-CM

## 2021-03-04 DIAGNOSIS — Z80.3 FAMILY HISTORY OF BREAST CANCER IN MOTHER: ICD-10-CM

## 2021-03-04 DIAGNOSIS — N60.19 FIBROCYSTIC BREAST CHANGES, UNSPECIFIED LATERALITY: Primary | ICD-10-CM

## 2021-03-04 DIAGNOSIS — N64.4 CHRONIC PAIN OF BREAST: ICD-10-CM

## 2021-03-04 PROCEDURE — 99213 OFFICE O/P EST LOW 20 MIN: CPT | Performed by: SURGERY

## 2021-03-04 NOTE — PROGRESS NOTES
Surgical Oncology Follow Up       [address] [facility]  [facility] [address]  [facility] [id number]    Whitley Ramosparvez  1965  [id number]  [address]  [facility] [place] [id number]    Chief Complaint   Patient presents with    Follow-up       Assessment/Plan   Diagnoses and all orders for this visit:    Fibrocystic breast changes, unspecified laterality    Chronic pain of breast    Family history of breast cancer in mother        Advance Care Planning/Advance Directives:  Did not discuss  with the patient  Oncology History:    Oncology History    No history exists  History of Present Illness: Follow-up visit secondary to fibrocystic changes, family history of breast cancer, reports persistent/chronic pain in the left breast   -Interval History:  Recent mammogram    Review of Systems:  Review of Systems   Constitutional: Negative  Negative for appetite change and fever  Eyes: Negative  Respiratory: Negative for shortness of breath  Cardiovascular: Negative  Gastrointestinal: Negative  Endocrine: Negative  Genitourinary: Negative  Musculoskeletal: Negative  Negative for arthralgias and myalgias  Skin: Negative  Allergic/Immunologic: Negative  Neurological: Negative  Hematological: Negative  Negative for adenopathy  Does not bruise/bleed easily  Psychiatric/Behavioral: Negative          Patient Active Problem List   Diagnosis    Fibrocystic breast changes    Family history of breast cancer in mother   [place] Encounter for screening mammogram for breast cancer    Encounter for gynecological examination without abnormal finding    Pap smear for cervical cancer screening    Pelvic pain    Acute cystitis with hematuria    Chronic pain of breast     Past Medical History:   Diagnosis Date    Anxiety     Arthritis     Asthenia     Asthma  Cervical vertebral fusion     Disease of thyroid gland     Fibrocystic breast changes     GERD (gastroesophageal reflux disease)     Heartburn     High risk HPV infection     IBS (irritable bowel syndrome)     Palpitations     Palpitations     Thyroid disease     Thyroid nodule     Urinary frequency      Past Surgical History:   Procedure Laterality Date    BACK SURGERY  2008    cervical vertebral fusion    BREAST CYST ASPIRATION Left 2007    CERVICAL FUSION  2008    CERVIX SURGERY      cryosurgery    DILATION AND CURETTAGE OF UTERUS      SAB    HYSTERECTOMY  2007    SHOULDER SURGERY Left      Family History   Problem Relation Age of Onset    Breast cancer Mother         age at dx unk    Heart disease Father     No Known Problems Sister     No Known Problems Daughter     No Known Problems Maternal Grandmother     No Known Problems Maternal Grandfather     No Known Problems Paternal Grandmother     No Known Problems Paternal Grandfather      Social History     Socioeconomic History    Marital status: /Civil Union     Spouse name: Not on file    Number of children: 1    Years of education: Not on file    Highest education level: Not on file   Occupational History    Occupation:    Social Needs    Financial resource strain: Not on file    Food insecurity     Worry: Not on file     Inability: Not on file   Tamazight s0cket needs     Medical: Not on file     Non-medical: Not on file   Tobacco Use    Smoking status: Never Smoker    Smokeless tobacco: Never Used   Substance and Sexual Activity    Alcohol use: No    Drug use: No    Sexual activity: Yes     Partners: Male     Comment: pt declines hiv std testing   Lifestyle    Physical activity     Days per week: Not on file     Minutes per session: Not on file    Stress: Not on file   Relationships    Social connections     Talks on phone: Not on file     Gets together: Not on file     Attends Nondenominational service: Not on file     Active member of club or organization: Not on file     Attends meetings of clubs or organizations: Not on file     Relationship status: Not on file    Intimate partner violence     Fear of current or ex partner: Not on file     Emotionally abused: Not on file     Physically abused: Not on file     Forced sexual activity: Not on file   Other Topics Concern    Not on file   Social History Narrative    Denied: H/O domestic violence    Lives with        Current Outpatient Medications:     albuterol (PROVENTIL HFA,VENTOLIN HFA) 90 mcg/act inhaler, Inhale 2 puffs, Disp: , Rfl:     ALPRAZolam (XANAX) 0 5 mg tablet, TAKE 1 TABLET BY MOUTH AT BEDTIME AS NEEDED, Disp: , Rfl:     diazepam (VALIUM) 5 mg tablet, Take 5 mg by mouth every 6 (six) hours as needed for anxiety, Disp: , Rfl:     montelukast (SINGULAIR) 10 mg tablet, TAKE 1 TABLET (10 MG TOTAL) BY MOUTH NIGHTLY , Disp: , Rfl:     omeprazole (PRILOSEC) 20 mg delayed release capsule, Take 20 mg by mouth , Disp: , Rfl:     PULMICORT FLEXHALER 90 MCG/ACT inhaler, INHALE TWO PUFFS TWICE A DAY , Disp: , Rfl: 0  Allergies   Allergen Reactions    Bactrim [Sulfamethoxazole-Trimethoprim] Itching    Duloxetine Hcl Vomiting    Hydrocodone Headache    Morphine Headache    Oxycodone Vomiting    Percocet  [Oxycodone-Acetaminophen]     Sulfa Antibiotics     Trimethoprim        The following portions of the patient's history were reviewed and updated as appropriate: allergies, current medications, past family history, past medical history, past social history, past surgical history and problem list         Vitals:    03/04/21 1358   BP: 118/82   Pulse: 75   Temp: 97 6 °F (36 4 °C)       Physical Exam  Constitutional:       General: She is not in acute distress  Appearance: Normal appearance  She is well-developed  HENT:      Head: Normocephalic and atraumatic     Chest:      Breasts:         Right: No inverted nipple, mass, nipple discharge, skin change or tenderness  Left: No inverted nipple, mass, nipple discharge, skin change or tenderness  Lymphadenopathy:      Upper Body:      Right upper body: No supraclavicular, axillary or pectoral adenopathy  Left upper body: No supraclavicular, axillary or pectoral adenopathy  Neurological:      Mental Status: She is alert and oriented to person, place, and time  Psychiatric:         Mood and Affect: Mood normal            Results:  Labs:      Imaging   02/22/2021 bilateral 3D screening mammogram is benign BI-RADS one with a density of two    I reviewed the above imaging data  Discussion/Summary: 49-year-old female with fibrocystic changes and family history of breast cancer  She has chronic pain in the left breast   There are no concerns on her examination today  Her recent mammogram was benign  We will therefore see her again in six months or sooner should the need arise

## 2021-06-11 ENCOUNTER — TELEPHONE (OUTPATIENT)
Dept: GASTROENTEROLOGY | Facility: CLINIC | Age: 56
End: 2021-06-11

## 2021-06-11 NOTE — TELEPHONE ENCOUNTER
Faxed cardiac clearance request to Dr Magdi Riddle as pt has hx of tachycardia [phone]  Will call their office in one week to make sure that it was received [phone]

## 2021-06-18 NOTE — TELEPHONE ENCOUNTER
Called Dr Abe Foster office, spoke to [facility] whom informed that Dr Humberto Corea does not remember seeing it, however, he is here today and to refax to [phone] attn: [facility] and he will make sure that he reviews it  Will call again in one week if do not receive back

## 2021-06-25 NOTE — TELEPHONE ENCOUNTER
Patient called and inquired about a phone call she received from Dr Argentina Dawson office on why she has to be seen prior to her colonoscopy  I told her it was because of her hx of tachycardia  Our procedure center is requiring clearance  She will call and make an appointment with them  Thanks!

## 2021-06-25 NOTE — TELEPHONE ENCOUNTER
Called and spoke to Dr Mika Carrillo office and was informed that they did receive clearance however, pt needs an appt which they will call and schedule  I will call in one month if do not receive clearance back to check on status

## 2021-08-25 ENCOUNTER — TELEPHONE (OUTPATIENT)
Dept: GASTROENTEROLOGY | Facility: CLINIC | Age: 56
End: 2021-08-25

## 2021-08-25 NOTE — TELEPHONE ENCOUNTER
Patient left message asking for a call tomorrow after 9:30 am to reschedule her 9/2 colonoscopy  Will call tomorrow

## 2021-09-14 ENCOUNTER — TELEPHONE (OUTPATIENT)
Dept: HEMATOLOGY ONCOLOGY | Facility: CLINIC | Age: 56
End: 2021-09-14

## 2021-09-14 NOTE — TELEPHONE ENCOUNTER
Reschedule Appointment     Who is calling in Patient    Doctor Appointment Scheduled with Mike Reese date and time 09/15 at 3:00pm   New date and time 02/24 at 9:00am    Location [facility]   Patient verbalized understanding    yes

## 2021-10-21 ENCOUNTER — TELEPHONE (OUTPATIENT)
Dept: GASTROENTEROLOGY | Facility: CLINIC | Age: 56
End: 2021-10-21

## 2021-10-29 ENCOUNTER — ANESTHESIA EVENT (OUTPATIENT)
Dept: GASTROENTEROLOGY | Facility: AMBULATORY SURGERY CENTER | Age: 56
End: 2021-10-29

## 2021-10-29 ENCOUNTER — HOSPITAL ENCOUNTER (OUTPATIENT)
Dept: GASTROENTEROLOGY | Facility: AMBULATORY SURGERY CENTER | Age: 56
Discharge: HOME | End: 2021-10-29
Payer: MEDICARE

## 2021-10-29 ENCOUNTER — ANESTHESIA (OUTPATIENT)
Dept: GASTROENTEROLOGY | Facility: AMBULATORY SURGERY CENTER | Age: 56
End: 2021-10-29

## 2021-10-29 VITALS
SYSTOLIC BLOOD PRESSURE: 120 MMHG | DIASTOLIC BLOOD PRESSURE: 70 MMHG | BODY MASS INDEX: 24.24 KG/M2 | OXYGEN SATURATION: 98 % | WEIGHT: 142 LBS | HEART RATE: 98 BPM | RESPIRATION RATE: 18 BRPM | TEMPERATURE: 97.8 F | HEIGHT: 64 IN

## 2021-10-29 DIAGNOSIS — Z86.010 HX OF COLONIC POLYPS: ICD-10-CM

## 2021-10-29 PROCEDURE — 45378 DIAGNOSTIC COLONOSCOPY: CPT | Performed by: INTERNAL MEDICINE

## 2021-10-29 PROCEDURE — 00812 ANES LWR INTST SCR COLSC: CPT | Performed by: NURSE ANESTHETIST, CERTIFIED REGISTERED

## 2021-10-29 RX ORDER — SODIUM CHLORIDE 9 MG/ML
INJECTION, SOLUTION INTRAVENOUS CONTINUOUS PRN
Status: DISCONTINUED | OUTPATIENT
Start: 2021-10-29 | End: 2021-10-29

## 2021-10-29 RX ORDER — SODIUM CHLORIDE 9 MG/ML
20 INJECTION, SOLUTION INTRAVENOUS CONTINUOUS
Status: DISCONTINUED | OUTPATIENT
Start: 2021-10-29 | End: 2021-11-02 | Stop reason: HOSPADM

## 2021-10-29 RX ORDER — LACTOBACILLUS COMBINATION NO.4 3B CELL
CAPSULE ORAL
COMMUNITY

## 2021-10-29 RX ORDER — PROPOFOL 10 MG/ML
INJECTION, EMULSION INTRAVENOUS AS NEEDED
Status: DISCONTINUED | OUTPATIENT
Start: 2021-10-29 | End: 2021-10-29

## 2021-10-29 RX ORDER — SODIUM CHLORIDE 9 MG/ML
30 INJECTION, SOLUTION INTRAVENOUS CONTINUOUS
Status: DISCONTINUED | OUTPATIENT
Start: 2021-10-29 | End: 2021-11-02 | Stop reason: HOSPADM

## 2021-10-29 RX ORDER — FLUTICASONE PROPIONATE 50 MCG
SPRAY, SUSPENSION (ML) NASAL
COMMUNITY
Start: 2021-10-01

## 2021-10-29 RX ADMIN — PROPOFOL 100 MG: 10 INJECTION, EMULSION INTRAVENOUS at 08:01

## 2021-10-29 RX ADMIN — PROPOFOL 100 MG: 10 INJECTION, EMULSION INTRAVENOUS at 08:05

## 2021-10-29 RX ADMIN — PROPOFOL 50 MG: 10 INJECTION, EMULSION INTRAVENOUS at 08:14

## 2021-10-29 RX ADMIN — SODIUM CHLORIDE: 9 INJECTION, SOLUTION INTRAVENOUS at 07:59

## 2021-10-29 RX ADMIN — PROPOFOL 100 MG: 10 INJECTION, EMULSION INTRAVENOUS at 08:10

## 2022-02-23 ENCOUNTER — HOSPITAL ENCOUNTER (OUTPATIENT)
Dept: RADIOLOGY | Facility: MEDICAL CENTER | Age: 57
Discharge: HOME | End: 2022-02-23
Payer: MEDICARE

## 2022-02-23 VITALS — HEIGHT: 64 IN | BODY MASS INDEX: 24.24 KG/M2 | WEIGHT: 142 LBS

## 2022-02-23 DIAGNOSIS — Z12.31 ENCOUNTER FOR SCREENING MAMMOGRAM FOR BREAST CANCER: ICD-10-CM

## 2022-02-23 PROCEDURE — 77063 BREAST TOMOSYNTHESIS BI: CPT

## 2022-02-23 PROCEDURE — 77067 SCR MAMMO BI INCL CAD: CPT

## 2022-02-24 ENCOUNTER — OFFICE VISIT (OUTPATIENT)
Dept: SURGICAL ONCOLOGY | Facility: CLINIC | Age: 57
End: 2022-02-24
Payer: MEDICARE

## 2022-02-24 VITALS
HEART RATE: 83 BPM | WEIGHT: 141 LBS | BODY MASS INDEX: 24.07 KG/M2 | HEIGHT: 64 IN | SYSTOLIC BLOOD PRESSURE: 120 MMHG | OXYGEN SATURATION: 99 % | DIASTOLIC BLOOD PRESSURE: 76 MMHG | RESPIRATION RATE: 12 BRPM

## 2022-02-24 DIAGNOSIS — Z12.31 ENCOUNTER FOR SCREENING MAMMOGRAM FOR BREAST CANCER: ICD-10-CM

## 2022-02-24 DIAGNOSIS — N60.19 FIBROCYSTIC BREAST CHANGES, UNSPECIFIED LATERALITY: ICD-10-CM

## 2022-02-24 DIAGNOSIS — Z12.31 VISIT FOR SCREENING MAMMOGRAM: ICD-10-CM

## 2022-02-24 DIAGNOSIS — Z80.3 FAMILY HISTORY OF BREAST CANCER IN MOTHER: Primary | ICD-10-CM

## 2022-02-24 PROCEDURE — 99213 OFFICE O/P EST LOW 20 MIN: CPT | Performed by: NURSE PRACTITIONER

## 2022-02-24 RX ORDER — MAGNESIUM 30 MG
30 TABLET ORAL 2 TIMES DAILY
COMMUNITY

## 2022-02-24 RX ORDER — OMEGA-3S/DHA/EPA/FISH OIL/D3 300MG-1000
400 CAPSULE ORAL DAILY
COMMUNITY

## 2022-02-24 RX ORDER — MULTIVIT WITH MINERALS/LUTEIN
250 TABLET ORAL DAILY
COMMUNITY

## 2022-02-24 NOTE — PROGRESS NOTES
Surgical Oncology Follow Up       [address]  [facility] [place]  [address]  1965  [id number]      Chief Complaint   Patient presents with    Follow-up     6 month        Assessment/Plan:  1  Family history of breast cancer in mother  - Ambulatory Referral to Oncology Genetics; Future    2  Fibrocystic breast changes, unspecified laterality    3  Encounter for screening mammogram for breast cancer    4  Visit for screening mammogram  - Mammo screening bilateral w 3d & cad; Future    Discussion/Summary: Patient is a 47-year-old female who presents today for a follow-up visit for a family history of and unknown metastatic cancer in her mother and fibrocystic changes  She had a bilateral 3D screening mammogram performed on 2/23 which was BI-RADS 1, category 2 density  She continues to experience intermittent left breast tenderness  She has taken evening Primrose oil in the past and states she did not tolerate this well  I discussed the option of taking vitamin E  Patient will research this and consider taking this to help with her breast discomfort  Otherwise she will continue dietary modifications  I also encouraged a supportive bra  There are no worrisome findings on her breast exam today  Patient reports that her maternal 1st cousin recently passed away from breast cancer  It is believed that her mother had a primary breast cancer that metastasized at the age of 48  We discussed the option of genetic testing although I am not certain this would be covered by the patient's insurance  Patient is interested in paying out-of-pocket if needed  I will refer her to oncology Genetics  I will make arrangements for her mammogram next year and we will plan to see her back at that time or sooner should the need arise  She was instructed to call with any new concerns or symptoms prior to that time    All of her questions were answered today  History of Present Illness:     -Interval History:  Patient presents today for follow-up visit for fibrocystic changes and a family history of breast cancer  She states a maternal 1st cousin recently passed away from breast cancer in her 62s  Patient notices no changes on her self breast exam   She had a bilateral mammogram on February 23, 2022 which was BI-RADS 1, category 2 density  She reports ongoing intermittent left breast tenderness  She notices increased tenderness with caffeine consumption  Review of Systems:  Review of Systems   Constitutional: Negative for chills, fatigue and fever  Respiratory: Negative for cough and shortness of breath  Cardiovascular: Negative for chest pain  Hematological: Negative for adenopathy  Psychiatric/Behavioral: Negative for confusion         Patient Active Problem List   Diagnosis    Fibrocystic breast changes    Family history of breast cancer in mother   Barbara Leisure Encounter for screening mammogram for breast cancer    Encounter for gynecological examination without abnormal finding    Pap smear for cervical cancer screening    Pelvic pain    Acute cystitis with hematuria    Chronic pain of breast     Past Medical History:   Diagnosis Date    Anxiety     Arthritis     Asthenia     Asthma     Cervical vertebral fusion     Colon polyp     Disease of thyroid gland     Fibrocystic breast changes     GERD (gastroesophageal reflux disease)     Heartburn     High risk HPV infection     History of tachycardia     IBS (irritable bowel syndrome)     IC (interstitial cystitis)     Palpitations     Palpitations     PONV (postoperative nausea and vomiting)     Sleep apnea     cpap    Thyroid disease     Thyroid nodule     Urinary frequency      Past Surgical History:   Procedure Laterality Date    BACK SURGERY  2008    cervical vertebral fusion    BREAST CYST ASPIRATION Left 2007    BUNIONECTOMY Left     CERVICAL FUSION  2008    CERVIX SURGERY      cryosurgery    COLONOSCOPY      DILATION AND CURETTAGE OF UTERUS      SAB    HYSTERECTOMY  2007    SHOULDER SURGERY Left     WISDOM TOOTH EXTRACTION       Family History   Problem Relation Age of Onset    Breast cancer Mother         age at dx unk    Heart disease Father     No Known Problems Sister     No Known Problems Daughter     No Known Problems Maternal Grandmother     No Known Problems Maternal Grandfather     No Known Problems Paternal Grandmother     No Known Problems Paternal Grandfather      Social History     Socioeconomic History    Marital status: /Civil Union     Spouse name: Not on file    Number of children: 1    Years of education: Not on file    Highest education level: Not on file   Occupational History    Occupation:    Tobacco Use    Smoking status: Never Smoker    Smokeless tobacco: Never Used   Vaping Use    Vaping Use: Never used   Substance and Sexual Activity    Alcohol use: No    Drug use: No    Sexual activity: Yes     Partners: Male     Comment: pt declines hiv std testing   Other Topics Concern    Not on file   Social History Narrative    Denied: H/O domestic violence    Lives with      Social Determinants of Health     Financial Resource Strain: Not on file   Food Insecurity: Not on file   Transportation Needs: Not on file   Physical Activity: Not on file   Stress: Not on file   Social Connections: Not on file   Intimate Partner Violence: Not on file   Housing Stability: Not on file       Current Outpatient Medications:     albuterol (PROVENTIL HFA,VENTOLIN HFA) 90 mcg/act inhaler, Inhale 2 puffs, Disp: , Rfl:     ascorbic acid (VITAMIN C) 250 mg tablet, Take 250 mg by mouth daily, Disp: , Rfl:     B Complex Vitamins (VITAMIN B COMPLEX PO), Vitamin B Complex CAPS  Refills: 0   , M D ; Active, Disp: , Rfl:     cholecalciferol (VITAMIN D3) 400 units tablet, Take 400 Units by mouth daily, Disp: , Rfl:     fluticasone (FLONASE) 50 mcg/act nasal spray, , Disp: , Rfl:     magnesium 30 MG tablet, Take 30 mg by mouth 2 (two) times a day, Disp: , Rfl:     montelukast (SINGULAIR) 10 mg tablet, TAKE 1 TABLET (10 MG TOTAL) BY MOUTH NIGHTLY , Disp: , Rfl:     omeprazole (PRILOSEC) 20 mg delayed release capsule, Take 20 mg by mouth , Disp: , Rfl:     PULMICORT FLEXHALER 90 MCG/ACT inhaler, INHALE TWO PUFFS TWICE A DAY , Disp: , Rfl: 0    Zinc Sulfate (ZINC 15 PO), Take by mouth, Disp: , Rfl:     ALPRAZolam (XANAX) 0 5 mg tablet, TAKE 1 TABLET BY MOUTH AT BEDTIME AS NEEDED (Patient not taking: Reported on 2/24/2022), Disp: , Rfl:     diazepam (VALIUM) 5 mg tablet, Take 5 mg by mouth every 6 (six) hours as needed for anxiety (Patient not taking: Reported on 2/24/2022 ), Disp: , Rfl:     Probiotic Product ([employer] Probiotic) CAPS, Take by mouth, Disp: , Rfl:   Allergies   Allergen Reactions    Bactrim [Sulfamethoxazole-Trimethoprim] Itching    Duloxetine Hcl Vomiting    Hydrocodone Headache    Morphine Headache    Oxycodone Vomiting    Percocet  [Oxycodone-Acetaminophen]     Sulfa Antibiotics     Trimethoprim      Vitals:    02/24/22 0859   BP: 120/76   Pulse: 83   Resp: 12   SpO2: 99%       Physical Exam  Vitals reviewed  Constitutional:       Appearance: Normal appearance  HENT:      Head: Normocephalic and atraumatic  Pulmonary:      Effort: Pulmonary effort is normal    Chest:   Breasts:      Right: No swelling, bleeding, inverted nipple, mass, nipple discharge, skin change, tenderness, axillary adenopathy or supraclavicular adenopathy  Left: Tenderness present  No swelling, bleeding, inverted nipple, mass, nipple discharge, skin change, axillary adenopathy or supraclavicular adenopathy  Lymphadenopathy:      Upper Body:      Right upper body: No supraclavicular or axillary adenopathy  Left upper body: No supraclavicular or axillary adenopathy     Neurological:      General: No focal deficit present  Mental Status: She is alert and oriented to person, place, and time  Psychiatric:         Mood and Affect: Mood normal          Advance Care Planning/Advance Directives:  Discussed disease status and treatment goals with the patient

## 2022-03-02 ENCOUNTER — TELEPHONE (OUTPATIENT)
Dept: GENETICS | Facility: CLINIC | Age: 57
End: 2022-03-02

## 2022-03-02 NOTE — TELEPHONE ENCOUNTER
I called Michelle Mallory to schedule a new patient appointment with the [facility]       Outcome:   I left a voice message encouraging the patient to call the genetics team at [phone] to schedule this appointment  Follow-up:   At this time the referral will be closed and we will wait to hear back from the patient regarding scheduling this appointment

## 2022-03-03 ENCOUNTER — TELEPHONE (OUTPATIENT)
Dept: GENETICS | Facility: CLINIC | Age: 57
End: 2022-03-03

## 2022-03-03 NOTE — TELEPHONE ENCOUNTER
Patient returned our call, genetics appt scheduled for 10/12   Fhx of breast cancer in mom and m cousin no genetic testing done

## 2022-10-04 ENCOUNTER — TELEPHONE (OUTPATIENT)
Dept: GENETICS | Facility: CLINIC | Age: 57
End: 2022-10-04

## 2022-10-04 NOTE — TELEPHONE ENCOUNTER
I called and spoke with Lizbeth Mckeon ahead of her genetic counseling appointment on 10/12/2022  I explained that we have a family history questionnaire available that Lizbeth Mckeon can fill out prior to her visit in order to expedite the family history intake  In order to complete this form, I can send Lizbeth Mckeon a link via email that will direct her to the questionaire  Outcome:  Patient is agreeable to completing this questionaire  I confirmed the patient's email address and will send a link

## 2022-10-10 ENCOUNTER — TELEPHONE (OUTPATIENT)
Dept: HEMATOLOGY ONCOLOGY | Facility: CLINIC | Age: 57
End: 2022-10-10

## 2022-10-12 ENCOUNTER — CLINICAL SUPPORT (OUTPATIENT)
Dept: GENETICS | Facility: CLINIC | Age: 57
End: 2022-10-12

## 2022-10-12 ENCOUNTER — TELEPHONE (OUTPATIENT)
Dept: GENETICS | Facility: CLINIC | Age: 57
End: 2022-10-12

## 2022-10-12 DIAGNOSIS — Z80.3 FAMILY HISTORY OF BREAST CANCER IN MOTHER: ICD-10-CM

## 2022-10-12 PROCEDURE — NC001 PR NO CHARGE

## 2022-10-12 NOTE — LETTER
2022     [address] [place] [id number]    Patient: Edwardo Norman  YOB: 1965  Date of Visit: 10/12/2022      Dear Dr Sonu Millanr: Thank you for referring Alta Means to me for evaluation  Below are my notes for this consultation  If you have questions, please do not hesitate to call me  I look forward to following your patient along with you  Sincerely,        Darell Mccoy        CC: No Recipients        Pre-Test Genetic Counseling Consult Note    Patient Name: Edwardo Norman   /Age: 1965/57 y o  Referring Provider: David Pascual    Date of Service: 10/12/2022  Genetic Counselor: Darell Mccoy, [facility]  Interpretation Services: None  Location: Telephone consult   Length of Visit: 60 minutes      [place] was referred to the [facility] [address] and Genetic Assessment Program due to her family history of breast cancer  she presents today to discuss the possibility of a hereditary cancer syndrome, options for genetic testing, and implications for her and her family  Cancer History and Treatment:   Personal History: no personal history of cancer    Screening Hx:   Breast:  Breast Imaging: Mammogram (2022): FINDINGS:   There are no suspicious masses, grouped microcalcifications or areas of architectural distortion  The skin and nipple areolar complex are unremarkable  IMPRESSION:  No mammographic evidence of malignancy  Breast Biopsy: none  Breast Density: Scattered fibroglandular density     Colon:  Colonoscopy (2022): no polyps reported per patient  (2016):  One 7 mm descending polyp reported    Gynecologic:  Ovaries intact XU at 40    Skin:  F/u every other year     Other screening: Thyroid ultrasound secondary to thyroid nodules     Reproductive History  Age at menarche: 10  Age at first live birth: 30  Menopause: Post Menopausal (unknown)  Hormone replacement: none    Medical and Surgical History  Pertinent surgical history:   Past Surgical History:   Procedure Laterality Date   • BACK SURGERY  2008    cervical vertebral fusion   • BREAST CYST ASPIRATION Left 2007   • BUNIONECTOMY Left    • CERVICAL FUSION  2008   • CERVIX SURGERY      cryosurgery   • COLONOSCOPY     • DILATION AND CURETTAGE OF UTERUS      SAB   • HYSTERECTOMY  2007   • SHOULDER SURGERY Left    • WISDOM TOOTH EXTRACTION        Pertinent medical history:  Past Medical History:   Diagnosis Date   • Anxiety    • Arthritis    • Asthenia    • Asthma    • Cervical vertebral fusion    • Colon polyp    • Disease of thyroid gland    • Fibrocystic breast changes    • GERD (gastroesophageal reflux disease)    • Heartburn    • High risk HPV infection    • History of tachycardia    • IBS (irritable bowel syndrome)    • IC (interstitial cystitis)    • Palpitations    • Palpitations    • PONV (postoperative nausea and vomiting)    • Sleep apnea     cpap   • Thyroid disease    • Thyroid nodule    • Urinary frequency          Other History:  Height:   Ht Readings from Last 1 Encounters:   02/24/22 5' 4" (1 626 m)     Weight:   Wt Readings from Last 1 Encounters:   02/24/22 64 kg (141 lb)       Relevant Family History   Patient reports non-Ashkenazi Yazidi ancestry  Niece: thyroid cancer, unknown type, diagnosed at <19 y/o     Maternal Family History: Mother: breast cancer diagnosed at 48 (d 50)  Limited information about family history and structure    Paternal Family History:  Grandmother: brain cancer diagnosed at [de-identified] (d 80)     Please refer to the scanned pedigree in the Media Tab for a complete family history     *All history is reported as provided by the patient; records are not available for review, except where indicated  Assessment:  We discussed sporadic, familial and hereditary cancer  We also discussed the many factors that influence our risk for cancer such as age, environmental exposures, lifestyle choices and family history  We reviewed the indications suggestive of a hereditary predisposition to cancer  Genetic testing is indicated for Elma Nassar based on the following criteria: Meets NCCN V1 2023 Testing Criteria for High-Penetrance Breast Cancer Susceptibility Genes: first-degree relative (mother) diagnosed with breast cancer at 48; limited information about family history and structure on maternal and paternal side  The risks, benefits, and limitations of genetic testing were reviewed with the patient, as well as genetic discrimination laws, and possible test results (positive, negative, variants of uncertain significance) and their clinical implications  If positive for a mutation, options for managing cancer risk including increased surveillance, chemoprevention, and in some cases prophylactic surgery were discussed  Elma Nassar was informed that if a hereditary cancer syndrome was identified in her, first degree relatives (parents, siblings, and children) have a chance of also inheriting the condition  Genetic testing would allow for predictive genetic testing in other relatives, who may also be at risk depending on their degree of relation  Plan: Patient decided to proceed with testing and provided consent  Summary:     Sample Collection:  A saliva kit was mailed home to the patient    Genetic Testing Preformed: [facility] Common Hereditary Cancers Panel (47 genes): APC, NEISHA, AXIN2, BARD1, BMPR1A, BRCA1, BRCA2, BRIP1, CDH1, CDK4, CDKN2A, CHEK2, CTNNA1, DICER1, EPCAM, GREM1, HOXB13, KIT, MEN1, MLH1, MSH2, MSH3, MSH6, MUTYH, NBN, NF1, NTHL1, PALB2, PDGFRA, PMS2, POLD1, POLE, PTEN, RAD50, RAD51C, RAD51D, SDHA, SDHB, SDHC, SDHD, SMAD4, SMARCA4, STK11, TP53, TSC1, TSC2, VHL  [facility] Thyroid Cancer Panel (11 genes): APC, CHEK2, DICER1, MEN1,PFGMN8E, PTEN, RET, SDHB, SDHD, TP53, WRN    Results take approximately 2-3 weeks to complete once test is started  We will contact Elma Nassar once results are available        Additional recommendations for surveillance/medical management will be made pending genetic test results

## 2022-10-12 NOTE — PROGRESS NOTES
Pre-Test Genetic Counseling Consult Note    Patient Name: Renée DOWNEY/Age: 1965/57 y o  Referring Provider: David Allen    Date of Service: 10/12/2022  Genetic Counselor: Marci Chapin [facility]  Interpretation Services: None  Location: Telephone consult   Length of Visit: 60 minutes      Dandre Vale was referred to the [address] [place] [address] and Genetic Assessment Program due to her family history of breast cancer  she presents today to discuss the possibility of a hereditary cancer syndrome, options for genetic testing, and implications for her and her family  Cancer History and Treatment:   Personal History: no personal history of cancer    Screening Hx:   Breast:  Breast Imaging: Mammogram (2022): FINDINGS:   There are no suspicious masses, grouped microcalcifications or areas of architectural distortion  The skin and nipple areolar complex are unremarkable  IMPRESSION:  No mammographic evidence of malignancy  Breast Biopsy: none  Breast Density: Scattered fibroglandular density     Colon:  Colonoscopy (2022): no polyps reported per patient  (2016):  One 7 mm descending polyp reported    Gynecologic:  Ovaries intact XU at 40    Skin:  F/u every other year     Other screening: Thyroid ultrasound secondary to thyroid nodules     Reproductive History  Age at menarche: 10  Age at first live birth: 30  Menopause: Post Menopausal (unknown)  Hormone replacement: none    Medical and Surgical History  Pertinent surgical history:   Past Surgical History:   Procedure Laterality Date   • BACK SURGERY      cervical vertebral fusion   • BREAST CYST ASPIRATION Left    • BUNIONECTOMY Left    • CERVICAL FUSION     • CERVIX SURGERY      cryosurgery   • COLONOSCOPY     • DILATION AND CURETTAGE OF UTERUS      SAB   • HYSTERECTOMY     • SHOULDER SURGERY Left    • WISDOM TOOTH EXTRACTION        Pertinent medical history:  Past Medical History:   Diagnosis Date   • Anxiety    • Arthritis    • Asthenia    • Asthma    • Cervical vertebral fusion    • Colon polyp    • Disease of thyroid gland    • Fibrocystic breast changes    • GERD (gastroesophageal reflux disease)    • Heartburn    • High risk HPV infection    • History of tachycardia    • IBS (irritable bowel syndrome)    • IC (interstitial cystitis)    • Palpitations    • Palpitations    • PONV (postoperative nausea and vomiting)    • Sleep apnea     cpap   • Thyroid disease    • Thyroid nodule    • Urinary frequency          Other History:  Height:   Ht Readings from Last 1 Encounters:   02/24/22 5' 4" (1 626 m)     Weight:   Wt Readings from Last 1 Encounters:   02/24/22 64 kg (141 lb)       Relevant Family History   Patient reports non-Ashkenazi Adventism ancestry  Niece: thyroid cancer, unknown type, diagnosed at <21 y/o     Maternal Family History: Mother: breast cancer diagnosed at 48 (d 50)  Limited information about family history and structure    Paternal Family History:  Grandmother: brain cancer diagnosed at [de-identified] (d 80)     Please refer to the scanned pedigree in the Media Tab for a complete family history     *All history is reported as provided by the patient; records are not available for review, except where indicated  Assessment:  We discussed sporadic, familial and hereditary cancer  We also discussed the many factors that influence our risk for cancer such as age, environmental exposures, lifestyle choices and family history  We reviewed the indications suggestive of a hereditary predisposition to cancer  Genetic testing is indicated for Royer Acosta based on the following criteria: Meets NCCN V1 2023 Testing Criteria for High-Penetrance Breast Cancer Susceptibility Genes: first-degree relative (mother) diagnosed with breast cancer at 48; limited information about family history and structure on maternal and paternal side       The risks, benefits, and limitations of genetic testing were reviewed with the patient, as well as genetic discrimination laws, and possible test results (positive, negative, variants of uncertain significance) and their clinical implications  If positive for a mutation, options for managing cancer risk including increased surveillance, chemoprevention, and in some cases prophylactic surgery were discussed  [place] was informed that if a hereditary cancer syndrome was identified in her, first degree relatives (parents, siblings, and children) have a chance of also inheriting the condition  Genetic testing would allow for predictive genetic testing in other relatives, who may also be at risk depending on their degree of relation  Plan: Patient decided to proceed with testing and provided consent  Summary:     Sample Collection:  A saliva kit was mailed home to the patient    Genetic Testing Preformed: [facility] Common Hereditary Cancers Panel (47 genes): APC, NEISHA, AXIN2, BARD1, BMPR1A, BRCA1, BRCA2, BRIP1, CDH1, CDK4, CDKN2A, CHEK2, CTNNA1, DICER1, EPCAM, GREM1, HOXB13, KIT, MEN1, MLH1, MSH2, MSH3, MSH6, MUTYH, NBN, NF1, NTHL1, PALB2, PDGFRA, PMS2, POLD1, POLE, PTEN, RAD50, RAD51C, RAD51D, SDHA, SDHB, SDHC, SDHD, SMAD4, SMARCA4, STK11, TP53, TSC1, TSC2, VHL  [facility] Thyroid Cancer Panel (11 genes): APC, CHEK2, DICER1, MEN1,JNQYP2U, PTEN, RET, SDHB, SDHD, TP53, WRN    Results take approximately 2-3 weeks to complete once test is started  We will contact Meadow once results are available  Additional recommendations for surveillance/medical management will be made pending genetic test results

## 2022-10-14 ENCOUNTER — APPOINTMENT (OUTPATIENT)
Dept: LAB | Facility: CLINIC | Age: 57
End: 2022-10-14
Payer: MEDICARE

## 2022-10-14 ENCOUNTER — TRANSCRIBE ORDERS (OUTPATIENT)
Dept: LAB | Facility: CLINIC | Age: 57
End: 2022-10-14

## 2022-10-14 DIAGNOSIS — Z80.3 FAMILY HISTORY OF MALIGNANT NEOPLASM OF BREAST: ICD-10-CM

## 2022-10-14 DIAGNOSIS — Z80.3 FAMILY HISTORY OF MALIGNANT NEOPLASM OF BREAST: Primary | ICD-10-CM

## 2022-10-14 PROCEDURE — 36415 COLL VENOUS BLD VENIPUNCTURE: CPT

## 2022-10-26 ENCOUNTER — TELEPHONE (OUTPATIENT)
Dept: GENETICS | Facility: CLINIC | Age: 57
End: 2022-10-26

## 2022-10-26 NOTE — TELEPHONE ENCOUNTER
Post-Test Genetic Counseling Consult Note  Today I left a voicemail for Dena Beach reviewing the results of her genetic test for hereditary cancer  We met previously on 10/12/22 for pre-test counseling  I encouraged Dena Beach to call [phone] to discuss this result further  A copy of this consult note and genetic test result will be shared with the patient  SUMMARY:    Test(s): [facility] Common Hereditary Cancers Panel + Thyroid Cancer Panel (50 genes): APC, NEISHA, AXIN2, BARD1, BMPR1A, BRCA1, BRCA2, BRIP1, CDH1, CDK4, CDKN2A, CHEK2, CTNNA1, DICER1, EPCAM, GREM1, HOXB13, KIT, MEN1, MLH1, MSH2, MSH3, MSH6, MUTYH, NBN, NF1, NTHL1, PALB2, PDGFRA, PMS2, POLD1, POLE, TLBNW8T,  PTEN, RAD50, RAD51C, RAD51D, RET, SDHA, SDHB, SDHC, SDHD, SMAD4, SMARCA4, STK11, TP53, TSC1, TSC2, VHL, WRN    Result: Negative - No Clinically Significant Variants Detected      Assessment:   A negative result significantly reduces the likelihood that Dena Beach has a hereditary cancer syndrome  However, this testing is unable to completely rule out the presence of hereditary cancer  It remains possible that:  - There is a variant in an area of a gene which was not tested or there is a variant not detectable due to technical limitations of this test      - There is a variant in another gene that was not included in this test or in a gene not known to be linked to cancer or tumors  - A family member has a genetic variant that the patient did not inherit  - The cancer in the family is sporadic and is related to non-hereditary factors  Risk based on Family History:  Coys risk of breast cancer may still be increased based on her personal risk factors and family history  Despite a negative test result, we are able to run risk models to better estimate Coys lifetime risk of developing breast cancer   I ran two risk models based on the information Dena Beach provided during our pre-test counseling session:    Katelynn model: Estimated lifetime risk, to age 80, for breast cancer is 9 2% compared to approximately 7 5% general population risk     Kalie Carpinteria model:Estimated lifetime risk, to age 80, for breast cancer is 13 4% compared to approximately 9 3% general population risk     Although these risk models do not predict a significantly increased lifetime risk, we cannot eliminate the possibility of an increased risk for breast cancer for Dena Beach given her family history  I also reminded Dena Beach that there is still a background risk for any woman to develop breast cancer over her lifetime (12-13%), regardless of family history  Risks and Testing for Family Members:  Despite a negative result, Karlee's first-degree relatives may be at increased risk for the cancers based on the family history  We recommend they discuss screening and management recommendations with their healthcare providers  At this time we do not recommend testing for Dena Beach 's daughter based on her negative test result  Dena Beach 's daughter still needs to consider the history of cancer on the other side of their family when determining their risks  If Dena Beach has any affected family members with a cancer diagnosis, especially at a young age, they may still consider genetic testing  Relatives who wish to pursue genetic testing can reach out to the [address] [place] [address] Road ([phone]) to schedule an appointment or visit www [facility] org to identify a local genetic counselor  Additional Information:  A healthy lifestyle will improve overall health and reduce risk for illness  Eating a healthy diet and exercising for 4 hours per week is recommended  Both diet and exercise have been shown to help maintain a healthy weight  Postmenopausal women who are overweight are at higher risk for breast cancer  Moderate to heavy alcohol use can increase the risk for some cancers  Smoking cigarettes can also increase risk for breast, lung, prostate, pancreatic and other cancers        Plan:   There are no additional recommendations based on Karlee's negative result  she should continue cancer screening and medical management as clinically indicated and as determined appropriate by her healthcare providers  Negative Result: Neris Prestongurwinder was strongly encouraged to contact us regarding any changes in her personal or family history of cancer as these changes could alter our recommendation regarding genetic testing and/or cancer screening

## 2023-01-10 ENCOUNTER — TELEPHONE (OUTPATIENT)
Dept: GENETICS | Facility: CLINIC | Age: 58
End: 2023-01-10

## 2023-01-10 NOTE — TELEPHONE ENCOUNTER
I called and spoke to the Fordriсветлана Louisa and explained that since she is a medicare patient, if the lab denies this service, she will not be billed  This information was confirmed by Marylee Cheek at the billing department from [facility]  I advised her to disregard the EOB and reach out to us if she receive a bill from [facility]

## 2023-02-16 ENCOUNTER — TELEPHONE (OUTPATIENT)
Dept: SURGICAL ONCOLOGY | Facility: CLINIC | Age: 58
End: 2023-02-16

## 2023-02-16 NOTE — TELEPHONE ENCOUNTER
Called patient and explained the need to reschedule her follow up with Dr Jazmyne Burdick due to provider being in the OR this day  Patient requested to see Xavi Reyes and I explained she could but it would need to be in [facility]  Patient states she can't drive out to [facility] so offered a follow up with Poppy Crenshaw at the [address] [place] [address] for 3/6/23 at 1pm and she was agreeable and appreciative

## 2023-02-24 ENCOUNTER — HOSPITAL ENCOUNTER (OUTPATIENT)
Dept: RADIOLOGY | Facility: MEDICAL CENTER | Age: 58
Discharge: HOME | End: 2023-02-24

## 2023-02-24 VITALS — BODY MASS INDEX: 25.27 KG/M2 | HEIGHT: 64 IN | WEIGHT: 148 LBS

## 2023-02-24 DIAGNOSIS — Z12.31 VISIT FOR SCREENING MAMMOGRAM: ICD-10-CM

## 2023-02-24 DIAGNOSIS — Z12.31 ENCOUNTER FOR SCREENING MAMMOGRAM FOR MALIGNANT NEOPLASM OF BREAST: ICD-10-CM

## 2023-03-06 ENCOUNTER — OFFICE VISIT (OUTPATIENT)
Dept: SURGICAL ONCOLOGY | Facility: CLINIC | Age: 58
End: 2023-03-06

## 2023-03-06 VITALS
WEIGHT: 153 LBS | RESPIRATION RATE: 18 BRPM | OXYGEN SATURATION: 99 % | BODY MASS INDEX: 26.12 KG/M2 | HEIGHT: 64 IN | SYSTOLIC BLOOD PRESSURE: 128 MMHG | DIASTOLIC BLOOD PRESSURE: 75 MMHG | HEART RATE: 85 BPM

## 2023-03-06 DIAGNOSIS — Z12.31 VISIT FOR SCREENING MAMMOGRAM: ICD-10-CM

## 2023-03-06 DIAGNOSIS — N60.19 FIBROCYSTIC BREAST CHANGES, UNSPECIFIED LATERALITY: ICD-10-CM

## 2023-03-06 DIAGNOSIS — Z80.3 FAMILY HISTORY OF BREAST CANCER IN MOTHER: Primary | ICD-10-CM

## 2023-03-06 NOTE — PROGRESS NOTES
Surgical Oncology Follow Up       [address] [facility] [facility]  [facility] [place]  [address] [address]    Luana Ramosparvez  1965  [id number]  [address] [facility] [facility]  [facility] [place]  [address] [facility] [id number]    No chief complaint on file  Assessment/Plan:  1  Family history of breast cancer in mother  - 1 year follow up  - negative genetics    2  Fibrocystic breast changes, unspecified laterality    3  Visit for screening mammogram  - Mammo screening bilateral w 3d & cad; Future       Discussion/Summary: Patient is a 49-year-old female presenting today for a follow-up visit for a family history of an unknown metastatic cancer in her mother and fibrocystic changes   Patient did undergo genetic testing last year which was negative  She also had a bilateral screening mammogram on 2/24/2023 which was BI-RADS 1 category 2 density  We have been following her annually with clinical breast exams  There were no concerns on her clinical breast exam   Patient states that she is getting her knees replaced this year  She denies changes on her breast exam but notes she does have left breast pain at times I recommended vitamin E 200 to 600 IU/day  Patient was appreciative  I will see her back in 1 year or sooner should the need arise  She was instructed to call with any questions or concerns prior to that time  All questions were answered today  History of Present Illness:     Oncology History    No history exists         -Interval History: Patient is a 49-year-old female presenting today for a follow-up visit for a family history of an unknown metastatic cancer in her mother and fibrocystic changes   She also had a bilateral screening mammogram on 2/24/2023 which was BI-RADS 1 category 2 density  Genetics were negative  Patient denies changes on her breast exam or changes with her family history   She sees derm 1x a year and recently had 3 spots on her chest/neck removed  Review of Systems:  Review of Systems   Constitutional: Negative for activity change, appetite change, fatigue and unexpected weight change  Respiratory: Negative for cough and shortness of breath  Cardiovascular: Negative for chest pain  Gastrointestinal: Negative for abdominal pain, diarrhea, nausea and vomiting  Endocrine: Negative for heat intolerance  Musculoskeletal: Negative for arthralgias, back pain and myalgias  Skin: Negative for rash  Neurological: Negative for weakness and headaches  Hematological: Negative for adenopathy         Patient Active Problem List   Diagnosis   • Fibrocystic breast changes   • Family history of breast cancer in mother   • Encounter for screening mammogram for breast cancer   • Encounter for gynecological examination without abnormal finding   • Pap smear for cervical cancer screening   • Pelvic pain   • Acute cystitis with hematuria   • Chronic pain of breast     Past Medical History:   Diagnosis Date   • Anxiety    • Arthritis    • Asthenia    • Asthma    • Cervical vertebral fusion    • Colon polyp    • Disease of thyroid gland    • Fibrocystic breast changes    • GERD (gastroesophageal reflux disease)    • Heartburn    • High risk HPV infection    • History of tachycardia    • IBS (irritable bowel syndrome)    • IC (interstitial cystitis)    • Palpitations    • Palpitations    • PONV (postoperative nausea and vomiting)    • Sleep apnea     cpap   • Thyroid disease    • Thyroid nodule    • Urinary frequency      Past Surgical History:   Procedure Laterality Date   • BACK SURGERY  2008    cervical vertebral fusion   • BREAST CYST ASPIRATION Left 2007   • BUNIONECTOMY Left    • CERVICAL FUSION  2008   • CERVIX SURGERY      cryosurgery   • COLONOSCOPY     • DILATION AND CURETTAGE OF UTERUS      SAB   • HYSTERECTOMY  2007   • SHOULDER SURGERY Left    • WISDOM TOOTH EXTRACTION       Family History   Problem Relation Age of Onset   • Breast cancer Mother         age at dx unk   • Heart disease Father    • No Known Problems Sister    • No Known Problems Daughter    • No Known Problems Maternal Grandmother    • No Known Problems Maternal Grandfather    • No Known Problems Paternal Grandmother    • No Known Problems Paternal Grandfather      Social History     Socioeconomic History   • Marital status: /Civil Union     Spouse name: Not on file   • Number of children: 1   • Years of education: Not on file   • Highest education level: Not on file   Occupational History   • Occupation:    Tobacco Use   • Smoking status: Never   • Smokeless tobacco: Never   Vaping Use   • Vaping Use: Never used   Substance and Sexual Activity   • Alcohol use: No   • Drug use: No   • Sexual activity: Yes     Partners: Male     Comment: pt declines hiv std testing   Other Topics Concern   • Not on file   Social History Narrative    Denied: H/O domestic violence    Lives with      Social Determinants of Health     Financial Resource Strain: Not on file   Food Insecurity: Not on file   Transportation Needs: Not on file   Physical Activity: Not on file   Stress: Not on file   Social Connections: Not on file   Intimate Partner Violence: Not on file   Housing Stability: Not on file       Current Outpatient Medications:   •  albuterol (PROVENTIL HFA,VENTOLIN HFA) 90 mcg/act inhaler, Inhale 2 puffs, Disp: , Rfl:   •  ALPRAZolam (XANAX) 0 5 mg tablet, TAKE 1 TABLET BY MOUTH AT BEDTIME AS NEEDED (Patient not taking: Reported on 2/24/2022), Disp: , Rfl:   •  ascorbic acid (VITAMIN C) 250 mg tablet, Take 250 mg by mouth daily, Disp: , Rfl:   •  B Complex Vitamins (VITAMIN B COMPLEX PO), Vitamin B Complex CAPS  Refills: 0   , M D ; Active, Disp: , Rfl:   •  cholecalciferol (VITAMIN D3) 400 units tablet, Take 400 Units by mouth daily, Disp: , Rfl:   •  diazepam (VALIUM) 5 mg tablet, Take 5 mg by mouth every 6 (six) hours as needed for anxiety (Patient not taking: Reported on 2/24/2022 ), Disp: , Rfl:   •  fluticasone (FLONASE) 50 mcg/act nasal spray, , Disp: , Rfl:   •  magnesium 30 MG tablet, Take 30 mg by mouth 2 (two) times a day, Disp: , Rfl:   •  montelukast (SINGULAIR) 10 mg tablet, TAKE 1 TABLET (10 MG TOTAL) BY MOUTH NIGHTLY , Disp: , Rfl:   •  omeprazole (PRILOSEC) 20 mg delayed release capsule, Take 20 mg by mouth , Disp: , Rfl:   •  Probiotic Product ([employer] Probiotic) CAPS, Take by mouth, Disp: , Rfl:   •  PULMICORT FLEXHALER 90 MCG/ACT inhaler, INHALE TWO PUFFS TWICE A DAY , Disp: , Rfl: 0  •  Zinc Sulfate (ZINC 15 PO), Take by mouth, Disp: , Rfl:   Allergies   Allergen Reactions   • Bactrim [Sulfamethoxazole-Trimethoprim] Itching   • Duloxetine Hcl Vomiting   • Hydrocodone Headache   • Morphine Headache   • Oxycodone Vomiting   • Percocet  [Oxycodone-Acetaminophen]    • Sulfa Antibiotics    • Trimethoprim      There were no vitals filed for this visit  Physical Exam  Constitutional:       General: She is not in acute distress  Appearance: Normal appearance  Cardiovascular:      Rate and Rhythm: Normal rate and regular rhythm  Pulses: Normal pulses  Heart sounds: Normal heart sounds  Pulmonary:      Effort: Pulmonary effort is normal       Breath sounds: Normal breath sounds  Chest:      Chest wall: No mass  Breasts:     Right: No swelling, bleeding, inverted nipple, mass, nipple discharge, skin change or tenderness  Left: No swelling, bleeding, inverted nipple, mass, nipple discharge, skin change or tenderness  Abdominal:      General: Abdomen is flat  Palpations: Abdomen is soft  Lymphadenopathy:      Upper Body:      Right upper body: No supraclavicular, axillary or pectoral adenopathy  Left upper body: No supraclavicular, axillary or pectoral adenopathy  Skin:     General: Skin is warm  Neurological:      General: No focal deficit present        Mental Status: She is alert and oriented to person, place, and time  Psychiatric:         Mood and Affect: Mood normal          Behavior: Behavior normal            Results:    Imaging  Mammo screening bilateral w 3d & cad    Result Date: 2/28/2023  Narrative: DIAGNOSIS: Visit for screening mammogram; Encounter for screening mammogram for malignant neoplasm of breast TECHNIQUE: Digital screening mammography was performed  Computer Aided Detection (CAD) analyzed all applicable images  COMPARISONS: Prior breast imaging dated: 02/23/2022, 02/22/2021, 02/19/2020, 02/14/2017, and 02/12/2016 RELEVANT HISTORY: Family Breast Cancer History: History of breast cancer in Mother  Family Medical History: Family medical history includes breast cancer in mother  Personal History: No known relevant hormone history  Surgical history includes breast cyst aspiration and hysterectomy  Medical history includes fibrocystic breast  The patient is scheduled in a reminder system for screening mammography  8-10% of cancers will be missed on mammography  Management of a palpable abnormality must be based on clinical grounds  Patients will be notified of their results via letter from our facility  Accredited by [employer] and FDA  RISK ASSESSMENT: 5 Year Tyrer-Cuzick: 2 09 % 10 Year Tyrer-Cuzick: 4 49 % Lifetime Tyrer-Cuzick: 11 52 % TISSUE DENSITY: There are scattered areas of fibroglandular density  INDICATION: Hugo Vazquez is a 59 y o  female presenting for screening mammography  FINDINGS: There are no suspicious masses, grouped microcalcifications or areas of architectural distortion  The skin and nipple areolar complex are unremarkable  Impression: No mammographic evidence of malignancy  ASSESSMENT/BI-RADS CATEGORY: Left: 1 - Negative Right: 1 - Negative Overall: 1 - Negative RECOMMENDATION:      - Routine screening mammogram in 1 year for both breasts   Workstation ID: [id number]       I reviewed the above imaging data       Advance Care Planning/Advance Directives:  Discussed disease status, cancer treatment plans and/or cancer treatment goals with the patient

## 2024-02-21 PROBLEM — N30.01 ACUTE CYSTITIS WITH HEMATURIA: Status: RESOLVED | Noted: 2018-10-06 | Resolved: 2024-02-21

## 2024-02-21 PROBLEM — Z01.419 ENCOUNTER FOR GYNECOLOGICAL EXAMINATION WITHOUT ABNORMAL FINDING: Status: RESOLVED | Noted: 2018-10-06 | Resolved: 2024-02-21

## 2024-02-26 ENCOUNTER — HOSPITAL ENCOUNTER (OUTPATIENT)
Dept: RADIOLOGY | Facility: MEDICAL CENTER | Age: 59
Discharge: HOME | End: 2024-02-26
Payer: MEDICARE

## 2024-02-26 VITALS — BODY MASS INDEX: 23.9 KG/M2 | HEIGHT: 64 IN | WEIGHT: 140 LBS

## 2024-02-26 DIAGNOSIS — Z12.31 ENCOUNTER FOR SCREENING MAMMOGRAM FOR MALIGNANT NEOPLASM OF BREAST: ICD-10-CM

## 2024-02-26 DIAGNOSIS — Z12.31 VISIT FOR SCREENING MAMMOGRAM: ICD-10-CM

## 2024-02-26 PROCEDURE — 77063 BREAST TOMOSYNTHESIS BI: CPT

## 2024-02-26 PROCEDURE — 77067 SCR MAMMO BI INCL CAD: CPT

## 2024-03-06 ENCOUNTER — OFFICE VISIT (OUTPATIENT)
Dept: SURGICAL ONCOLOGY | Facility: CLINIC | Age: 59
End: 2024-03-06
Payer: MEDICARE

## 2024-03-06 VITALS
DIASTOLIC BLOOD PRESSURE: 80 MMHG | HEART RATE: 95 BPM | OXYGEN SATURATION: 99 % | BODY MASS INDEX: 24.07 KG/M2 | TEMPERATURE: 97.9 F | HEIGHT: 64 IN | WEIGHT: 141 LBS | SYSTOLIC BLOOD PRESSURE: 122 MMHG | RESPIRATION RATE: 13 BRPM

## 2024-03-06 DIAGNOSIS — Z12.31 ENCOUNTER FOR SCREENING MAMMOGRAM FOR BREAST CANCER: Primary | ICD-10-CM

## 2024-03-06 DIAGNOSIS — Z12.31 VISIT FOR SCREENING MAMMOGRAM: ICD-10-CM

## 2024-03-06 DIAGNOSIS — Z80.3 FAMILY HISTORY OF BREAST CANCER IN MOTHER: ICD-10-CM

## 2024-03-06 DIAGNOSIS — N60.19 FIBROCYSTIC BREAST CHANGES, UNSPECIFIED LATERALITY: ICD-10-CM

## 2024-03-06 PROCEDURE — 99213 OFFICE O/P EST LOW 20 MIN: CPT

## 2024-03-06 RX ORDER — AMOXICILLIN AND CLAVULANATE POTASSIUM 875; 125 MG/1; MG/1
TABLET, FILM COATED ORAL AS NEEDED
COMMUNITY
Start: 2024-02-06

## 2024-03-06 RX ORDER — AMOXICILLIN 500 MG/1
2000 CAPSULE ORAL
COMMUNITY
Start: 2024-02-19

## 2024-03-06 NOTE — PROGRESS NOTES
Surgical Oncology Follow Up       [address] [facility] [place]  [address]  [place] [address]    Karlee Ramosparvez  1965  [id number]  [address] [facility] [place]  [address]  [place] [address]    Chief Complaint   Patient presents with   • Follow-up       Assessment/Plan:  1. Encounter for screening mammogram for breast cancer  - 1 year f/u    2. Fibrocystic breast changes, unspecified laterality    3. Family history of breast cancer in mother  - negative genetics     4. Visit for screening mammogram  - Mammo screening bilateral w 3d & cad; Future       Discussion/Summary: Patient is a 58-year-old female presenting today for a 1 year f/u for family history of an unknown metastatic cancer in her mother and fibrocystic changes. Patient had genetic testing which was negative. She had a bilateral screening mammogram on 2/26/2024 which was BI-RADS 2 category 2 density. We have been following her annually with clinical breast exams. There were no concerns on her clinical breast exam. I will see her back in 1 year or sooner should the need arise.  She was instructed to call with any questions or concerns prior to that time.  All questions were answered today.     History of Present Illness:     Oncology History    No history exists.        -Interval History: Patient is a 58-year-old female presenting today for a 1 year f/u for family history of breast cancer. She had a bilateral screening mammogram on 2/26/2024 which was BI-RADS 2 category 2 density. She had a right knee replacement last year. Patient denies changes on her breast exam. She denies persistent headaches, bone pain, back pain, shortness of breath, or abdominal pain.      Review of Systems:  Review of Systems   Constitutional:  Negative for activity change, appetite change, fatigue and unexpected weight change.   Respiratory:  Negative for  cough and shortness of breath.    Cardiovascular:  Negative for chest pain.   Gastrointestinal:  Negative for abdominal pain, diarrhea, nausea and vomiting.   Endocrine: Negative for heat intolerance.   Musculoskeletal:  Negative for arthralgias, back pain and myalgias.   Skin:  Negative for rash.   Neurological:  Negative for weakness and headaches.   Hematological:  Negative for adenopathy.   Psychiatric/Behavioral:  The patient is nervous/anxious.        Patient Active Problem List   Diagnosis   • Fibrocystic breast changes   • Family history of breast cancer in mother   • Encounter for screening mammogram for breast cancer   • Pap smear for cervical cancer screening   • Pelvic pain   • Chronic pain of breast     Past Medical History:   Diagnosis Date   • Anxiety    • Arthritis    • Asthenia    • Asthma    • Cervical vertebral fusion    • Colon polyp    • Disease of thyroid gland    • Fibrocystic breast changes    • GERD (gastroesophageal reflux disease)    • Heartburn    • High risk HPV infection    • History of tachycardia    • IBS (irritable bowel syndrome)    • IC (interstitial cystitis)    • Palpitations    • Palpitations    • PONV (postoperative nausea and vomiting)    • Sleep apnea     cpap   • Thyroid disease    • Thyroid nodule    • Urinary frequency      Past Surgical History:   Procedure Laterality Date   • BACK SURGERY  2008    cervical vertebral fusion   • BREAST CYST ASPIRATION Left 2007   • BUNIONECTOMY Left    • CERVICAL FUSION  2008   • CERVIX SURGERY      cryosurgery   • COLONOSCOPY     • DILATION AND CURETTAGE OF UTERUS      SAB   • HYSTERECTOMY  2007   • SHOULDER SURGERY Left    • WISDOM TOOTH EXTRACTION       Family History   Problem Relation Age of Onset   • Breast cancer Mother         age at dx unk   • Heart disease Father    • No Known Problems Sister    • No Known Problems Daughter    • No Known Problems Maternal Grandmother    • No Known Problems Maternal Grandfather    • No Known  Problems Paternal Grandmother    • No Known Problems Paternal Grandfather      Social History     Socioeconomic History   • Marital status: /Civil Union     Spouse name: Not on file   • Number of children: 1   • Years of education: Not on file   • Highest education level: Not on file   Occupational History   • Occupation:    Tobacco Use   • Smoking status: Never   • Smokeless tobacco: Never   Vaping Use   • Vaping status: Never Used   Substance and Sexual Activity   • Alcohol use: No   • Drug use: No   • Sexual activity: Yes     Partners: Male     Comment: pt declines hiv std testing   Other Topics Concern   • Not on file   Social History Narrative    Denied: H/O domestic violence    Lives with      Social Determinants of Health     Financial Resource Strain: Patient Declined (5/22/2023)    Received from [facility]    Overall Financial Resource Strain (CARDIA)    • Difficulty of Paying Living Expenses: Patient declined   Food Insecurity: Patient Declined (5/22/2023)    Received from [facility]    Hunger Vital Sign    • Worried About Running Out of Food in the Last Year: Patient declined    • Ran Out of Food in the Last Year: Patient declined   Transportation Needs: Patient Declined (5/22/2023)    Received from [facility]    PRAPARE - Transportation    • Lack of Transportation (Medical): Patient declined    • Lack of Transportation (Non-Medical): Patient declined   Physical Activity: Not on file   Stress: Patient Declined (5/22/2023)    Received from [facility]    [employer] [place] [employer] - Occupational Stress Questionnaire    • Feeling of Stress : Patient declined   Social Connections: Unknown (5/22/2023)    Received from [facility]    Social Connection and Isolation Panel [NHANES]    • Frequency of Communication with Friends and Family: Patient declined    • Frequency of Social  Gatherings with Friends and Family: Patient declined    • Attends Muslim Services: Patient declined    • Active Member of Clubs or Organizations: Patient declined    • Attends Club or Organization Meetings: Patient declined    • Marital Status:    Intimate Partner Violence: Not At Risk (5/22/2023)    Received from [facility]    Humiliation, Afraid, Rape, and Kick questionnaire    • Fear of Current or Ex-Partner: No    • Emotionally Abused: No    • Physically Abused: No    • Sexually Abused: No   Housing Stability: Unknown (5/22/2023)    Received from [facility]    Housing Stability Vital Sign    • Unable to Pay for Housing in the Last Year: No    • Number of Places Lived in the Last Year: 1    • Unstable Housing in the Last Year: Patient refused       Current Outpatient Medications:   •  albuterol (PROVENTIL HFA,VENTOLIN HFA) 90 mcg/act inhaler, Inhale 2 puffs as needed, Disp: , Rfl:   •  amoxicillin (AMOXIL) 500 mg capsule, Take 2,000 mg by mouth 60 minutes pre-procedure As needed for dental appointments., Disp: , Rfl:   •  amoxicillin-clavulanate (AUGMENTIN) 875-125 mg per tablet, as needed (As needed prior to dental appointment.), Disp: , Rfl:   •  ascorbic acid (VITAMIN C) 250 mg tablet, Take 250 mg by mouth daily, Disp: , Rfl:   •  B Complex Vitamins (VITAMIN B COMPLEX PO), Vitamin B Complex CAPS  Refills: 0   , M.D.; Active, Disp: , Rfl:   •  cholecalciferol (VITAMIN D3) 400 units tablet, Take 400 Units by mouth daily, Disp: , Rfl:   •  fluticasone (FLONASE) 50 mcg/act nasal spray, , Disp: , Rfl:   •  magnesium 30 MG tablet, Take 30 mg by mouth 2 (two) times a day, Disp: , Rfl:   •  montelukast (SINGULAIR) 10 mg tablet, TAKE 1 TABLET (10 MG TOTAL) BY MOUTH NIGHTLY., Disp: , Rfl:   •  omeprazole (PRILOSEC) 20 mg delayed release capsule, Take 20 mg by mouth as needed, Disp: , Rfl:   •  PULMICORT FLEXHALER 90 MCG/ACT inhaler, as needed, Disp: , Rfl: 0  •  Zinc Sulfate  (ZINC 15 PO), Take by mouth, Disp: , Rfl:   •  ALPRAZolam (XANAX) 0.5 mg tablet, TAKE 1 TABLET BY MOUTH AT BEDTIME AS NEEDED (Patient not taking: No sig reported), Disp: , Rfl:   •  diazepam (VALIUM) 5 mg tablet, Take 5 mg by mouth every 6 (six) hours as needed for anxiety (Patient not taking: Reported on 2/24/2022), Disp: , Rfl:   •  Probiotic Product ([employer] Probiotic) CAPS, Take by mouth (Patient not taking: Reported on 3/6/2024), Disp: , Rfl:   Allergies   Allergen Reactions   • Bactrim [Sulfamethoxazole-Trimethoprim] Itching   • Duloxetine Hcl Vomiting   • Hydrocodone Headache   • Morphine Headache   • Oxycodone Vomiting   • Percocet  [Oxycodone-Acetaminophen]    • Sulfa Antibiotics    • Trimethoprim      Vitals:    03/06/24 1256   BP: 122/80   Pulse: 95   Resp: 13   Temp: 97.9 °F (36.6 °C)   SpO2: 99%       Physical Exam  Constitutional:       General: She is not in acute distress.     Appearance: Normal appearance.   Cardiovascular:      Rate and Rhythm: Normal rate and regular rhythm.      Pulses: Normal pulses.      Heart sounds: Normal heart sounds.   Pulmonary:      Effort: Pulmonary effort is normal.      Breath sounds: Normal breath sounds.   Chest:      Chest wall: No mass.   Breasts:     Right: No swelling, bleeding, inverted nipple, mass, nipple discharge, skin change or tenderness.      Left: No swelling, bleeding, inverted nipple, mass, nipple discharge, skin change or tenderness.   Abdominal:      General: Abdomen is flat.      Palpations: Abdomen is soft.   Lymphadenopathy:      Upper Body:      Right upper body: No supraclavicular, axillary or pectoral adenopathy.      Left upper body: No supraclavicular, axillary or pectoral adenopathy.   Skin:     General: Skin is warm.   Neurological:      General: No focal deficit present.      Mental Status: She is alert and oriented to person, place, and time.   Psychiatric:         Mood and Affect: Mood normal.         Behavior: Behavior normal.            Results:    Imaging  Mammo screening bilateral w 3d & cad    Result Date: 2/27/2024  Narrative: DIAGNOSIS: Visit for screening mammogram; Encounter for screening mammogram for malignant neoplasm of breast TECHNIQUE: Digital screening mammography was performed. Computer Aided Detection (CAD) analyzed all applicable images. COMPARISONS: Multiple prior exams dated between 2/12/2016 and 2/24/2023. RELEVANT HISTORY: Family Breast Cancer History: History of breast cancer in Mother. Family Medical History: Family medical history includes breast cancer in mother. Personal History: No known relevant hormone history. Surgical history includes breast cyst aspiration and hysterectomy. Medical history includes fibrocystic breast. The patient is scheduled in a reminder system for screening mammography. 8-10% of cancers will be missed on mammography. Management of a palpable abnormality must be based on clinical grounds.  Patients will be notified of their results via letter from our facility. Accredited by [employer] and FDA. RISK ASSESSMENT: 5 Year Tyrer-Cuzick: 1.92% 10 Year Tyrer-Cuzick: 4.01% Lifetime Tyrer-Cuzick: 9.91% TISSUE DENSITY: There are scattered areas of fibroglandular density. INDICATION: Karlee Connelly is a 58 y.o. female presenting for screening mammography. FINDINGS: Bilateral There are no suspicious masses, grouped microcalcifications or areas of unexplained architectural distortion. The skin and nipple areolar complex are unremarkable.  Benign-appearing calcifications are noted in each breast.     Impression: No mammographic evidence of malignancy. ASSESSMENT/BI-RADS CATEGORY: Left: 2 - Benign Right: 2 - Benign Overall: 2 - Benign RECOMMENDATION:      - Routine screening mammogram in 1 year for both breasts. Workstation ID: [id number]       I reviewed the above imaging data.      Advance Care Planning/Advance Directives:  Discussed disease status, cancer treatment plans  and/or cancer treatment goals with the patient.

## 2024-06-19 ENCOUNTER — OFFICE VISIT (OUTPATIENT)
Dept: URGENT CARE | Facility: CLINIC | Age: 59
End: 2024-06-19
Payer: MEDICARE

## 2024-06-19 VITALS
BODY MASS INDEX: 25.1 KG/M2 | TEMPERATURE: 97.8 F | HEART RATE: 102 BPM | RESPIRATION RATE: 16 BRPM | HEIGHT: 64 IN | WEIGHT: 147 LBS | SYSTOLIC BLOOD PRESSURE: 126 MMHG | OXYGEN SATURATION: 98 % | DIASTOLIC BLOOD PRESSURE: 82 MMHG

## 2024-06-19 DIAGNOSIS — B37.9 ANTIBIOTIC-INDUCED YEAST INFECTION: ICD-10-CM

## 2024-06-19 DIAGNOSIS — T36.95XA ANTIBIOTIC-INDUCED YEAST INFECTION: ICD-10-CM

## 2024-06-19 DIAGNOSIS — R39.9 UTI SYMPTOMS: Primary | ICD-10-CM

## 2024-06-19 DIAGNOSIS — R31.9 HEMATURIA, UNSPECIFIED TYPE: ICD-10-CM

## 2024-06-19 LAB
SL AMB  POCT GLUCOSE, UA: ABNORMAL
SL AMB LEUKOCYTE ESTERASE,UA: ABNORMAL
SL AMB POCT BILIRUBIN,UA: ABNORMAL
SL AMB POCT BLOOD,UA: ABNORMAL
SL AMB POCT CLARITY,UA: ABNORMAL
SL AMB POCT COLOR,UA: ABNORMAL
SL AMB POCT KETONES,UA: ABNORMAL
SL AMB POCT NITRITE,UA: ABNORMAL
SL AMB POCT PH,UA: 6
SL AMB POCT SPECIFIC GRAVITY,UA: 1.01
SL AMB POCT URINE PROTEIN: 100
SL AMB POCT UROBILINOGEN: 0.2

## 2024-06-19 PROCEDURE — G0463 HOSPITAL OUTPT CLINIC VISIT: HCPCS | Performed by: NURSE PRACTITIONER

## 2024-06-19 PROCEDURE — 87086 URINE CULTURE/COLONY COUNT: CPT | Performed by: NURSE PRACTITIONER

## 2024-06-19 PROCEDURE — 81002 URINALYSIS NONAUTO W/O SCOPE: CPT | Performed by: NURSE PRACTITIONER

## 2024-06-19 PROCEDURE — 99213 OFFICE O/P EST LOW 20 MIN: CPT | Performed by: NURSE PRACTITIONER

## 2024-06-19 PROCEDURE — 87077 CULTURE AEROBIC IDENTIFY: CPT | Performed by: NURSE PRACTITIONER

## 2024-06-19 PROCEDURE — 87186 SC STD MICRODIL/AGAR DIL: CPT | Performed by: NURSE PRACTITIONER

## 2024-06-19 RX ORDER — FLUCONAZOLE 150 MG/1
150 TABLET ORAL ONCE
Qty: 2 TABLET | Refills: 0 | Status: SHIPPED | OUTPATIENT
Start: 2024-06-19 | End: 2024-06-19

## 2024-06-19 RX ORDER — ACYCLOVIR 50 MG/G
OINTMENT TOPICAL EVERY 4 HOURS
COMMUNITY
Start: 2024-05-20

## 2024-06-19 RX ORDER — METRONIDAZOLE 7.5 MG/G
GEL TOPICAL DAILY
COMMUNITY
Start: 2024-04-17

## 2024-06-19 RX ORDER — CIPROFLOXACIN 500 MG/1
500 TABLET, FILM COATED ORAL EVERY 12 HOURS SCHEDULED
Qty: 10 TABLET | Refills: 0 | Status: SHIPPED | OUTPATIENT
Start: 2024-06-19 | End: 2024-06-24

## 2024-06-19 RX ORDER — CLINDAMYCIN PHOSPHATE 10 MG/G
GEL TOPICAL
COMMUNITY
Start: 2024-06-14

## 2024-06-19 NOTE — PATIENT INSTRUCTIONS
--Urine dip suggestive of UTI (bladder infection).  Will treat presumptively for now while awaiting urine culture results (anticipate 48-72 hours). Will contact you with results if changes need to be made to the care plan discussed with you at the visit.    --Frequent fluids  --Take antibiotic as prescribed  --Seek re-evaluation if no improvement/worsening over the next 24-48 hours with these measures.

## 2024-06-21 LAB — BACTERIA UR CULT: ABNORMAL

## 2025-02-12 ENCOUNTER — OFFICE VISIT (OUTPATIENT)
Dept: GASTROENTEROLOGY | Facility: CLINIC | Age: 60
End: 2025-02-12
Payer: MEDICARE

## 2025-02-12 ENCOUNTER — TELEPHONE (OUTPATIENT)
Dept: GASTROENTEROLOGY | Facility: CLINIC | Age: 60
End: 2025-02-12

## 2025-02-12 VITALS
SYSTOLIC BLOOD PRESSURE: 137 MMHG | DIASTOLIC BLOOD PRESSURE: 80 MMHG | BODY MASS INDEX: 24.41 KG/M2 | HEART RATE: 92 BPM | WEIGHT: 143 LBS | HEIGHT: 64 IN

## 2025-02-12 DIAGNOSIS — Z12.11 ENCOUNTER FOR SCREENING COLONOSCOPY: ICD-10-CM

## 2025-02-12 DIAGNOSIS — K21.9 GASTROESOPHAGEAL REFLUX DISEASE, UNSPECIFIED WHETHER ESOPHAGITIS PRESENT: Primary | ICD-10-CM

## 2025-02-12 PROCEDURE — 99214 OFFICE O/P EST MOD 30 MIN: CPT | Performed by: PHYSICIAN ASSISTANT

## 2025-02-12 RX ORDER — SODIUM CHLORIDE, SODIUM LACTATE, POTASSIUM CHLORIDE, CALCIUM CHLORIDE 600; 310; 30; 20 MG/100ML; MG/100ML; MG/100ML; MG/100ML
125 INJECTION, SOLUTION INTRAVENOUS CONTINUOUS
OUTPATIENT
Start: 2025-02-12

## 2025-02-12 NOTE — PROGRESS NOTES
Name: Karlee Connelly      : 1965      MRN: [id number]  Encounter Provider: Jazmin Dang PA-C  Encounter Date: 2025   Encounter department: [facility] [place] [id number]  :  Assessment & Plan  Gastroesophageal reflux disease, unspecified whether esophagitis present  Patient with history of GERD, does take omeprazole intermittently, will plan for EGD to evaluate for any reflux related complications as she has not had one in the past 15 years but no history of Millan's esophagus, did discuss possible long-term side effects of the medication and risk versus benefit, she would like to do something more natural so she is going to try baking soda as needed but she could also try Tums or Pepcid as needed if she has breakthrough symptoms  Orders:    EGD; Future    Encounter for screening colonoscopy  Colonoscopy was normal back in  with recommended repeat in 10 years and she will contemplate having this done but would be appropriate for Cologuard since she has had normal colonoscopies or benign polyps but no family history of colon cancer    We will see her at the time of EGD           History of Present Illness   HPI  Karlee Connelly is a 59 y.o. female who presents for consultation to upper endoscopy.  Patient did have upper endoscopy in the distant past which was in  which did show minimal erosions of the distal esophagus consistent with reflux and pathology was benign.  Patient had no evidence of intestinal metaplasia but findings consistent with reflux and pathology.  Patient is feeling well but does take omeprazole intermittently for her reflux symptoms and she had a colonoscopy back in  and at that time did not have a EGD and colonoscopy at that time was also normal.  Does have history of liver hemangiomas and did want to know if she should have follow-up imaging in regards to that she also has simple liver cyst dating back to 2018.  Does report  "frequent belching but she does report that she uses CPAP so she thinks she is swallowing air.              Colonoscopy-2021  FINDINGS:  All observed locations appeared normal, including the ileocecal valve, cecum, ascending colon, transverse colon, descending colon, sigmoid colon and rectum  Colon due 10/31         Review of Systems       Objective   /80 (Patient Position: Sitting, Cuff Size: Standard)   Pulse 92   Ht 5' 4\" (1.626 m)   Wt 64.9 kg (143 lb)   BMI 24.55 kg/m²      Physical Exam      "

## 2025-02-12 NOTE — TELEPHONE ENCOUNTER
Scheduled date of EGD(as of today): 4/29/25  Physician performing EGD: Dr So  Location of EGD:   [facility] with patient [facility]   Clearances:  n/a

## 2025-02-27 ENCOUNTER — HOSPITAL ENCOUNTER (OUTPATIENT)
Dept: RADIOLOGY | Facility: MEDICAL CENTER | Age: 60
Discharge: HOME | End: 2025-02-27
Payer: MEDICARE

## 2025-02-27 VITALS — WEIGHT: 146 LBS | HEIGHT: 64 IN | BODY MASS INDEX: 24.92 KG/M2

## 2025-02-27 DIAGNOSIS — Z12.31 VISIT FOR SCREENING MAMMOGRAM: ICD-10-CM

## 2025-02-27 PROCEDURE — 77063 BREAST TOMOSYNTHESIS BI: CPT

## 2025-02-27 PROCEDURE — 77067 SCR MAMMO BI INCL CAD: CPT

## 2025-03-03 ENCOUNTER — OFFICE VISIT (OUTPATIENT)
Dept: SURGICAL ONCOLOGY | Facility: CLINIC | Age: 60
End: 2025-03-03
Payer: MEDICARE

## 2025-03-03 VITALS
OXYGEN SATURATION: 99 % | TEMPERATURE: 97.7 F | HEART RATE: 88 BPM | DIASTOLIC BLOOD PRESSURE: 76 MMHG | BODY MASS INDEX: 24.75 KG/M2 | RESPIRATION RATE: 18 BRPM | WEIGHT: 145 LBS | SYSTOLIC BLOOD PRESSURE: 122 MMHG | HEIGHT: 64 IN

## 2025-03-03 DIAGNOSIS — Z12.31 ENCOUNTER FOR SCREENING MAMMOGRAM FOR BREAST CANCER: Primary | ICD-10-CM

## 2025-03-03 DIAGNOSIS — Z80.3 FAMILY HISTORY OF BREAST CANCER IN MOTHER: ICD-10-CM

## 2025-03-03 PROCEDURE — 99213 OFFICE O/P EST LOW 20 MIN: CPT

## 2025-03-03 NOTE — PROGRESS NOTES
Name: Karlee Connelly      : 1965      MRN: [id number]  Encounter Provider: PREMA Espinoza  Encounter Date: 3/3/2025   Encounter department: [facility] [place]  :  Assessment & Plan  Encounter for screening mammogram for breast cancer  - 1 year f.u  Orders:  •  Mammo screening bilateral w 3d and cad; Future    Family history of breast cancer in mother  Patient is a 59-year-old female presenting today for a 1 year f/u for family history of an unknown metastatic cancer in her mother and a personal hx of fibrocystic changes. Patient had genetic testing which was negative. She had a bilateral screening mammogram on 2025 which was BI-RADS  category 3 density. We have been following her annually with clinical breast exams. There were no concerns on her clinical breast exam. She denies breast concerns and family hx changes. She has left breast tenderness and chronic discomfort at the lateral aspect of the left breast. I will see her back in 1 year or sooner should the need arise.  She was instructed to call with any questions or concerns prior to that time.  All questions were answered today.          Review of Systems   Constitutional:  Negative for activity change, appetite change, fatigue and unexpected weight change.   Respiratory:  Negative for cough and shortness of breath.    Cardiovascular:  Negative for chest pain.   Gastrointestinal:  Negative for abdominal pain, diarrhea, nausea and vomiting.   Endocrine: Negative for heat intolerance.   Musculoskeletal:  Negative for arthralgias, back pain and myalgias.   Skin:  Negative for rash.   Neurological:  Negative for weakness and headaches.   Hematological:  Negative for adenopathy.    A complete review of systems is negative other than that noted above in the HPI.      Objective   /76 (Patient Position: Sitting, Cuff Size: Standard)   Pulse 88   Temp 97.7 °F (36.5 °C) (Temporal)   Resp 18   Ht 5'  "4\" (1.626 m)   Wt 65.8 kg (145 lb)   SpO2 99%   BMI 24.89 kg/m²     Pain Screening:     ECOG    Physical Exam  Constitutional:       General: She is not in acute distress.     Appearance: Normal appearance.   Cardiovascular:      Rate and Rhythm: Normal rate and regular rhythm.      Pulses: Normal pulses.      Heart sounds: Normal heart sounds.   Pulmonary:      Effort: Pulmonary effort is normal.      Breath sounds: Normal breath sounds.   Chest:      Chest wall: No mass.   Breasts:     Right: No swelling, bleeding, inverted nipple, mass, nipple discharge, skin change or tenderness.      Left: Tenderness present. No swelling, bleeding, inverted nipple, mass, nipple discharge or skin change.   Abdominal:      General: Abdomen is flat.      Palpations: Abdomen is soft.   Lymphadenopathy:      Upper Body:      Right upper body: No supraclavicular, axillary or pectoral adenopathy.      Left upper body: No supraclavicular, axillary or pectoral adenopathy.   Skin:     General: Skin is warm.   Neurological:      General: No focal deficit present.      Mental Status: She is alert and oriented to person, place, and time.   Psychiatric:         Mood and Affect: Mood normal.         Behavior: Behavior normal.        No visits with results within 1 Month(s) from this visit.   Latest known visit with results is:   Office Visit on 06/19/2024   Component Date Value Ref Range Status   • LEUKOCYTE ESTERASE,UA 06/19/2024 MOD   Final   • NITRITE,UA 06/19/2024 NEG   Final   • SL AMB POCT UROBILINOGEN 06/19/2024 0.2   Final   • POCT URINE PROTEIN 06/19/2024 100   Final   •  PH,UA 06/19/2024 6   Final   • BLOOD,UA 06/19/2024 LARGE   Final   • SPECIFIC GRAVITY,UA 06/19/2024 1.015   Final   • KETONES,UA 06/19/2024 NEG   Final   • BILIRUBIN,UA 06/19/2024 NEG   Final   • GLUCOSE, UA 06/19/2024 NEG   Final   •  COLOR,UA 06/19/2024 RED   Final   • CLARITY,UA 06/19/2024 CLOUDY   Final   • Urine Culture 06/19/2024 >100,000 cfu/ml Citrobacter " salvador (A)   Final    This organism has been edited. The previous result was Gram Negative Catracho Enteric Like on 6/20/2024 at 1432 EDT.

## 2025-03-03 NOTE — ASSESSMENT & PLAN NOTE
Patient is a 59-year-old female presenting today for a 1 year f/u for family history of an unknown metastatic cancer in her mother and a personal hx of fibrocystic changes. Patient had genetic testing which was negative. She had a bilateral screening mammogram on 2/27/2025 which was BI-RADS  category 3 density. We have been following her annually with clinical breast exams. There were no concerns on her clinical breast exam. She denies breast concerns and family hx changes. She has left breast tenderness and chronic discomfort at the lateral aspect of the left breast. I will see her back in 1 year or sooner should the need arise.  She was instructed to call with any questions or concerns prior to that time.  All questions were answered today.

## 2025-04-25 ENCOUNTER — TELEPHONE (OUTPATIENT)
Age: 60
End: 2025-04-25